# Patient Record
Sex: MALE | Race: BLACK OR AFRICAN AMERICAN | NOT HISPANIC OR LATINO | Employment: OTHER | RURAL
[De-identification: names, ages, dates, MRNs, and addresses within clinical notes are randomized per-mention and may not be internally consistent; named-entity substitution may affect disease eponyms.]

---

## 2023-08-30 DIAGNOSIS — I73.9 PERIPHERAL VASCULAR DISEASE, UNSPECIFIED: Primary | ICD-10-CM

## 2023-09-27 ENCOUNTER — INITIAL CONSULT (OUTPATIENT)
Dept: VASCULAR SURGERY | Facility: CLINIC | Age: 68
End: 2023-09-27
Payer: OTHER GOVERNMENT

## 2023-09-27 VITALS — BODY MASS INDEX: 36.94 KG/M2 | HEIGHT: 70 IN | WEIGHT: 258 LBS

## 2023-09-27 DIAGNOSIS — Z72.0 TOBACCO ABUSE: ICD-10-CM

## 2023-09-27 DIAGNOSIS — I73.9 PERIPHERAL VASCULAR DISEASE, UNSPECIFIED: ICD-10-CM

## 2023-09-27 DIAGNOSIS — E11.59 TYPE 2 DIABETES MELLITUS WITH OTHER CIRCULATORY COMPLICATION, UNSPECIFIED WHETHER LONG TERM INSULIN USE: ICD-10-CM

## 2023-09-27 DIAGNOSIS — I73.9 PVD (PERIPHERAL VASCULAR DISEASE): Primary | ICD-10-CM

## 2023-09-27 PROCEDURE — 99204 OFFICE O/P NEW MOD 45 MIN: CPT | Mod: S$PBB,,, | Performed by: SURGERY

## 2023-09-27 PROCEDURE — 99203 OFFICE O/P NEW LOW 30 MIN: CPT | Mod: PBBFAC | Performed by: SURGERY

## 2023-09-27 PROCEDURE — 99204 PR OFFICE/OUTPT VISIT, NEW, LEVL IV, 45-59 MIN: ICD-10-PCS | Mod: S$PBB,,, | Performed by: SURGERY

## 2023-09-27 RX ORDER — SODIUM CHLORIDE 9 MG/ML
INJECTION, SOLUTION INTRAVENOUS CONTINUOUS
Status: CANCELLED | OUTPATIENT
Start: 2023-09-27

## 2023-09-27 RX ORDER — INSULIN ASPART 100 [IU]/ML
INJECTION, SOLUTION INTRAVENOUS; SUBCUTANEOUS
COMMUNITY

## 2023-09-27 RX ORDER — LISINOPRIL 10 MG/1
10 TABLET ORAL
COMMUNITY

## 2023-09-27 RX ORDER — INSULIN GLARGINE 100 [IU]/ML
60 INJECTION, SOLUTION SUBCUTANEOUS
COMMUNITY

## 2023-09-27 RX ORDER — CARVEDILOL 3.12 MG/1
TABLET ORAL
COMMUNITY

## 2023-09-27 RX ORDER — ONDANSETRON 4 MG/1
8 TABLET, ORALLY DISINTEGRATING ORAL EVERY 8 HOURS PRN
Status: CANCELLED | OUTPATIENT
Start: 2023-09-27

## 2023-09-27 RX ORDER — FUROSEMIDE 40 MG/1
TABLET ORAL
COMMUNITY

## 2023-09-27 RX ORDER — SPIRONOLACTONE 25 MG/1
TABLET ORAL
COMMUNITY

## 2023-09-27 RX ORDER — ONDANSETRON 2 MG/ML
4 INJECTION INTRAMUSCULAR; INTRAVENOUS EVERY 12 HOURS PRN
Status: CANCELLED | OUTPATIENT
Start: 2023-09-27

## 2023-09-27 RX ORDER — PANTOPRAZOLE SODIUM 40 MG/1
40 TABLET, DELAYED RELEASE ORAL
COMMUNITY

## 2023-09-27 RX ORDER — GABAPENTIN 300 MG/1
300 CAPSULE ORAL
COMMUNITY

## 2023-09-27 NOTE — PROGRESS NOTES
Subjective:       Patient ID: Heraclio Myers is a 68 y.o. male.    Chief Complaint: No chief complaint on file.  Fourth digit right foot mild perforans ulcer.  Great toe amputation on the right.  All toes amputated on left.  No previous vascular surgeries.  Said  angio Samson's states he is got vascular disease on the right, patient has had previous left SFA stent base of 2019 angio CT, recently was CT of the chest done [facility]on[facility]  family history is not on file.  History reviewed. No pertinent past medical history.   History reviewed. No pertinent surgical history.    reports that he has been smoking cigarettes. He has never used smokeless tobacco. He reports that he does not drink alcohol and does not use drugs.   HPI  Review of Systems      Objective:      There were no vitals taken for this visit.   Physical Exam  Constitutional:       Appearance: Normal appearance.   Cardiovascular:      Rate and Rhythm: Normal rate.   Abdominal:      General: Abdomen is flat.      Palpations: Abdomen is soft.      Comments: He is got a rectus sheath diastasis   Musculoskeletal:      Comments: All toes amputated left, right great toe amputated.  Mild perforans ulcer plantar aspect clean right 4th digit   Skin:     Capillary Refill: Capillary refill takes less than 2 seconds.   Neurological:      General: No focal deficit present.      Mental Status: He is alert.   Psychiatric:         Mood and Affect: Mood normal.         Behavior: Behavior normal.         Thought Content: Thought content normal.         Judgment: Judgment normal.           Assessment:       1. PVD (peripheral vascular disease)    2. Type 2 diabetes mellitus with other circulatory complication, unspecified whether long term insulin use    3. Tobacco abuse        Plan:       Angio right lower extremity intervention is indicated, stop smoking

## 2023-09-27 NOTE — PATIENT INSTRUCTIONS
[facility]      Your surgery is scheduled for 10/19 at [facility] on the ground floor of the Ambulatory building. You should arrive at 8:00 at the Ambulatory Care Center located at [address].                                                                                                                                                                                                                                                                                                                                                           Preoperative Instructions        Your pre-op lab work will be on 10/16 on the 1st floor of the [facility] building.                                                                                                                                            Day of Surgery Instructions      Bring a list of all your medications with you the day of your surgery. You can also give the list to your doctor or nurse during your final clinic appointment before surgery.      Do not eat any solid foods or drink any liquids after 12:00 AM (midnight). This includes gum, hard candy, mints, and chewing tobacco.  Medications: Take any medications specified with a small sip of water the morning of your surgery.  Brush your teeth: You may brush your teeth and rinse your mouth. Do not swallow any water or toothpaste.  Clothing: A button front shirt and loose-fitting clothes are the most comfortable before and after surgery. We also recommend low-heeled shoes.  Hair: Avoid buns, ponytails, or hairpieces at the back of the head. Remove or avoid any clips, pins or bands that bind hair. Do not use hairspray. Before going to surgery, you will need to remove any wigs or hairpieces.  We will cover your hair during surgery. Your privacy regarding personal appearance will be respected.  Fingernails: Please be sure to remove all nail polish before you arrive for surgery.  We understand that tips, wraps, gels, etc., are expensive; however, we ask these products to be removed from at least one finger on each hand. Your fingertips are used to accurately monitor your oxygen level during surgery by a device called an oximeter.  Glasses and Contact Lenses: Wear glasses when possible. If contact lenses must be worn, bring a lens case and solution. If glasses are worn, bring a case for them.  Hearing Aids: If you rely on a hearing aid, wear it to the hospital on the day of surgery. This will ensure you can hear and understand everything we need to communicate with you.  Valuables: Jewelry, including body piercings, Dentures, money, and credit cards should be left at home. [facility] is not responsible for valuables that are not secured in our surgery center.  Makeup, Perfume, Creams, Lotions and Deodorants: Do not use any of these products on the day of surgery. Remove false eyelashes prior to surgery.  Implanted Medical Devices: If you have an implanted device, such as a pacemaker or AICD, bring the device information card (if you have it) with you.  Medical Equipment: If you have been fitted for a brace to wear after surgery or you have been given crutches, bring those with you to the surgery center.  Shower: Take a shower with Hibiclens® (chlorhexidine) (available over the counter). This reduces the chance of infection. PLEASE USE CHLORHEXIDINE WASH THE NIGHT BEFORE SURGERY AND THE MORNING OF SURGERY.      Medication instructions:  You may take blood pressure medication with a small drink of water the morning of surgery.      IF YOU ARE ON ANY OF THESE BLOOD THINNERS, MAKE SURE YOUR PHYSICIAN IS AWARE.  Eliquis/Apixaban            Wafarin/Coumadin,Jantoven  Xarelto/Rivaroxaban      Pletal/Cilostazol  Plavix/Clopidogrel          Pradaxa/Dibigatran      If you are diabetic      Follow the diabetic medicine instructions you received during your pre-operative visit.  DO NOT take your insulin or  diabetic medications the morning of surgery.  When you arrive at the surgical center, be sure to tell the nurse you are diabetic.    The following blood sugar medications have to be stopped prior to surgery:    Hold 24 hours prior to surgery:    Libraglutide - Saxenda   Adlyxen - Lixixerutose  Byetta - Exenatide  Saxenda - Liralutide   Victoza    Hold 1 week prior to surgery:    Semaglutide - Ozempic, Wegouy, Rybelsus  Dulaglutide - Trulicity  Tiazepatide - Mounjaro  Bydurcon    Hold 48 hours prior to surgery:    Metformin, Glucovance, Metaglip, Fortamet, Glucophage, Riomet, Avandamet, Glimepiride            Other Items to bring with you and know      Insurance card  Identification card such as 's license, passport, or other picture ID  Copy of your advance directives  List of medications and allergies, if not already provided  Name and phone number of person to contact if your condition changes significantly. YOU CANNOT DRIVE YOURSELF HOME FROM THE HOSPITAL THE DAY OF SURGERY.  PLEASE UNDERSTAND THAT OUR OFFICE DOES NOT GIVE PATHOLOGY RESULTS OR TEST RESULTS OVER THE PHONE. THIS WILL BE DISCUSSED WITH YOU ON YOUR FOLLOW UP APPOINTMENT.          Alcohol and Surgery  We want to help you prepare for and recover from surgery as quickly and safely as possible. Be open and honest with your provider about how many drinks you have per day. Excessive alcohol use is defined as drinking more than three drinks per day. It can affect the outcome of your surgery. Binge drinking (consuming large amounts of alcohol infrequently, such as on weekends) can also affect the outcome of your surgery.  Alcohol withdrawal  If you drink more than three drinks a day, you could have a complication, called alcohol withdrawal, after surgery.  Alcohol withdrawal is a set of symptoms that people have when they suddenly stop drinking after using alcohol  for a long time. During withdrawal, a person's central nervous system overreacts. This  can cause mild symptoms such as shakiness, sweating or hallucinating. It can also cause other more serious side effects. If not treated properly, alcohol withdrawal can cause potentially life-threatening complications after surgery. This can include tremors, seizures, hallucinations, delirium tremors, and even death. Untreated alcohol withdrawal often leads to a longer stay in the hospital, potentially in the Intensive Care Unit.  Chronic heavy drinking also can interfere with several organ systems and biochemical processes in the body.  This interference can cause serious, even life-threatening complications.  Your care team can offer alcohol withdrawal treatment to help:  Decrease the risk of seizures and delirium tremors after surgery  Decrease the risk we will need to restrain you for your own safety or the safety of others  Decrease your risk of falling after surgery  Reduce the use of potent sedative medications  Reduce the time you stay in the hospital after surgery  Reduce the time you might spend on a mechanical ventilator to help you breathe  Lower incidence of organ failure and biochemical complications  Talk to a member of your care team or your primary care physician about your alcohol use if you feel you may be at risk of any of these complications.        Smoking and Surgery  Quitting smoking is extremely important for a successful surgery and recovery. Cigarette smoking compromises your immune system. This increases your risk of an infection after surgery. Quitting the habit before surgery will decrease the surgical risks associated with smoking.

## 2023-10-24 ENCOUNTER — ANESTHESIA (OUTPATIENT)
Dept: SURGERY | Facility: HOSPITAL | Age: 68
End: 2023-10-24
Payer: OTHER GOVERNMENT

## 2023-10-24 ENCOUNTER — HOSPITAL ENCOUNTER (OUTPATIENT)
Facility: HOSPITAL | Age: 68
Discharge: HOME | End: 2023-10-24
Attending: SURGERY | Admitting: SURGERY
Payer: OTHER GOVERNMENT

## 2023-10-24 ENCOUNTER — ANESTHESIA EVENT (OUTPATIENT)
Dept: SURGERY | Facility: HOSPITAL | Age: 68
End: 2023-10-24
Payer: OTHER GOVERNMENT

## 2023-10-24 VITALS
TEMPERATURE: 98 F | RESPIRATION RATE: 16 BRPM | SYSTOLIC BLOOD PRESSURE: 127 MMHG | HEART RATE: 92 BPM | WEIGHT: 256 LBS | DIASTOLIC BLOOD PRESSURE: 64 MMHG | OXYGEN SATURATION: 94 % | BODY MASS INDEX: 36.65 KG/M2 | HEIGHT: 70 IN

## 2023-10-24 DIAGNOSIS — I73.9 PVD (PERIPHERAL VASCULAR DISEASE): Primary | ICD-10-CM

## 2023-10-24 LAB
GLUCOSE SERPL-MCNC: 203 MG/DL (ref 70–105)
GLUCOSE SERPL-MCNC: 232 MG/DL (ref 70–105)
INDIRECT COOMBS: NORMAL
RH BLD: NORMAL
SPECIMEN OUTDATE: NORMAL

## 2023-10-24 PROCEDURE — 86850 RBC ANTIBODY SCREEN: CPT | Performed by: SURGERY

## 2023-10-24 PROCEDURE — 25000003 PHARM REV CODE 250: Performed by: ANESTHESIOLOGY

## 2023-10-24 PROCEDURE — 82962 GLUCOSE BLOOD TEST: CPT

## 2023-10-24 PROCEDURE — 37000009 HC ANESTHESIA EA ADD 15 MINS: Performed by: SURGERY

## 2023-10-24 PROCEDURE — 75716 PR  ANGIO EXTERMITY BILAT: ICD-10-PCS | Mod: 26,,, | Performed by: SURGERY

## 2023-10-24 PROCEDURE — D9220A PRA ANESTHESIA: ICD-10-PCS | Mod: CRNA,,, | Performed by: ANESTHESIOLOGY

## 2023-10-24 PROCEDURE — 71000033 HC RECOVERY, INTIAL HOUR: Performed by: SURGERY

## 2023-10-24 PROCEDURE — 75716 ARTERY X-RAYS ARMS/LEGS: CPT | Mod: 26,,, | Performed by: SURGERY

## 2023-10-24 PROCEDURE — D9220A PRA ANESTHESIA: ICD-10-PCS | Mod: ANES,,, | Performed by: ANESTHESIOLOGY

## 2023-10-24 PROCEDURE — D9220A PRA ANESTHESIA: Mod: CRNA,,, | Performed by: ANESTHESIOLOGY

## 2023-10-24 PROCEDURE — 37000008 HC ANESTHESIA 1ST 15 MINUTES: Performed by: SURGERY

## 2023-10-24 PROCEDURE — 63600175 PHARM REV CODE 636 W HCPCS: Performed by: ANESTHESIOLOGY

## 2023-10-24 PROCEDURE — C1894 INTRO/SHEATH, NON-LASER: HCPCS | Performed by: SURGERY

## 2023-10-24 PROCEDURE — 25500020 PHARM REV CODE 255: Performed by: SURGERY

## 2023-10-24 PROCEDURE — 36000706: Performed by: SURGERY

## 2023-10-24 PROCEDURE — 63600175 PHARM REV CODE 636 W HCPCS: Performed by: SURGERY

## 2023-10-24 PROCEDURE — 25000003 PHARM REV CODE 250: Performed by: NURSE PRACTITIONER

## 2023-10-24 PROCEDURE — C1769 GUIDE WIRE: HCPCS | Performed by: SURGERY

## 2023-10-24 PROCEDURE — 36000707: Performed by: SURGERY

## 2023-10-24 PROCEDURE — D9220A PRA ANESTHESIA: Mod: ANES,,, | Performed by: ANESTHESIOLOGY

## 2023-10-24 PROCEDURE — 71000015 HC POSTOP RECOV 1ST HR: Performed by: SURGERY

## 2023-10-24 PROCEDURE — 71000016 HC POSTOP RECOV ADDL HR: Performed by: SURGERY

## 2023-10-24 RX ORDER — IPRATROPIUM BROMIDE AND ALBUTEROL SULFATE 2.5; .5 MG/3ML; MG/3ML
3 SOLUTION RESPIRATORY (INHALATION) ONCE
Status: DISCONTINUED | OUTPATIENT
Start: 2023-10-24 | End: 2023-10-24 | Stop reason: HOSPADM

## 2023-10-24 RX ORDER — MEPERIDINE HYDROCHLORIDE 25 MG/ML
25 INJECTION INTRAMUSCULAR; INTRAVENOUS; SUBCUTANEOUS EVERY 10 MIN PRN
Status: DISCONTINUED | OUTPATIENT
Start: 2023-10-24 | End: 2023-10-24 | Stop reason: HOSPADM

## 2023-10-24 RX ORDER — CLINDAMYCIN PHOSPHATE 900 MG/50ML
900 INJECTION, SOLUTION INTRAVENOUS
Status: DISCONTINUED | OUTPATIENT
Start: 2023-10-24 | End: 2023-10-24 | Stop reason: HOSPADM

## 2023-10-24 RX ORDER — MIDAZOLAM HYDROCHLORIDE 1 MG/ML
INJECTION INTRAMUSCULAR; INTRAVENOUS
Status: DISCONTINUED | OUTPATIENT
Start: 2023-10-24 | End: 2023-10-24

## 2023-10-24 RX ORDER — CEFAZOLIN SODIUM 1 G/3ML
INJECTION, POWDER, FOR SOLUTION INTRAMUSCULAR; INTRAVENOUS
Status: DISCONTINUED | OUTPATIENT
Start: 2023-10-24 | End: 2023-10-24

## 2023-10-24 RX ORDER — SODIUM CHLORIDE 9 MG/ML
INJECTION, SOLUTION INTRAVENOUS CONTINUOUS
Status: DISCONTINUED | OUTPATIENT
Start: 2023-10-24 | End: 2023-10-24 | Stop reason: HOSPADM

## 2023-10-24 RX ORDER — PROPOFOL 10 MG/ML
VIAL (ML) INTRAVENOUS
Status: DISCONTINUED | OUTPATIENT
Start: 2023-10-24 | End: 2023-10-24

## 2023-10-24 RX ORDER — ONDANSETRON 2 MG/ML
4 INJECTION INTRAMUSCULAR; INTRAVENOUS EVERY 12 HOURS PRN
Status: DISCONTINUED | OUTPATIENT
Start: 2023-10-24 | End: 2023-10-24 | Stop reason: HOSPADM

## 2023-10-24 RX ORDER — DIPHENHYDRAMINE HYDROCHLORIDE 50 MG/ML
25 INJECTION INTRAMUSCULAR; INTRAVENOUS EVERY 6 HOURS PRN
Status: DISCONTINUED | OUTPATIENT
Start: 2023-10-24 | End: 2023-10-24 | Stop reason: HOSPADM

## 2023-10-24 RX ORDER — PHENYLEPHRINE HYDROCHLORIDE 10 MG/ML
INJECTION INTRAVENOUS
Status: DISCONTINUED | OUTPATIENT
Start: 2023-10-24 | End: 2023-10-24

## 2023-10-24 RX ORDER — ONDANSETRON 4 MG/1
8 TABLET, ORALLY DISINTEGRATING ORAL EVERY 8 HOURS PRN
Status: DISCONTINUED | OUTPATIENT
Start: 2023-10-24 | End: 2023-10-24 | Stop reason: HOSPADM

## 2023-10-24 RX ORDER — HYDROMORPHONE HYDROCHLORIDE 2 MG/ML
0.5 INJECTION, SOLUTION INTRAMUSCULAR; INTRAVENOUS; SUBCUTANEOUS EVERY 5 MIN PRN
Status: DISCONTINUED | OUTPATIENT
Start: 2023-10-24 | End: 2023-10-24 | Stop reason: HOSPADM

## 2023-10-24 RX ORDER — HEPARIN SODIUM 1000 [USP'U]/ML
INJECTION, SOLUTION INTRAVENOUS; SUBCUTANEOUS
Status: DISCONTINUED | OUTPATIENT
Start: 2023-10-24 | End: 2023-10-24 | Stop reason: HOSPADM

## 2023-10-24 RX ORDER — ONDANSETRON 2 MG/ML
INJECTION INTRAMUSCULAR; INTRAVENOUS
Status: DISCONTINUED | OUTPATIENT
Start: 2023-10-24 | End: 2023-10-24

## 2023-10-24 RX ORDER — LIDOCAINE HYDROCHLORIDE 20 MG/ML
INJECTION, SOLUTION EPIDURAL; INFILTRATION; INTRACAUDAL; PERINEURAL
Status: DISCONTINUED | OUTPATIENT
Start: 2023-10-24 | End: 2023-10-24

## 2023-10-24 RX ORDER — MORPHINE SULFATE 10 MG/ML
4 INJECTION INTRAMUSCULAR; INTRAVENOUS; SUBCUTANEOUS EVERY 5 MIN PRN
Status: DISCONTINUED | OUTPATIENT
Start: 2023-10-24 | End: 2023-10-24 | Stop reason: HOSPADM

## 2023-10-24 RX ORDER — FENTANYL CITRATE 50 UG/ML
INJECTION, SOLUTION INTRAMUSCULAR; INTRAVENOUS
Status: DISCONTINUED | OUTPATIENT
Start: 2023-10-24 | End: 2023-10-24

## 2023-10-24 RX ORDER — ONDANSETRON 2 MG/ML
4 INJECTION INTRAMUSCULAR; INTRAVENOUS DAILY PRN
Status: DISCONTINUED | OUTPATIENT
Start: 2023-10-24 | End: 2023-10-24 | Stop reason: HOSPADM

## 2023-10-24 RX ADMIN — CEFAZOLIN 100 MG: 1 INJECTION, POWDER, FOR SOLUTION INTRAMUSCULAR; INTRAVENOUS; PARENTERAL at 08:10

## 2023-10-24 RX ADMIN — PHENYLEPHRINE HYDROCHLORIDE 100 MCG: 10 INJECTION INTRAVENOUS at 07:10

## 2023-10-24 RX ADMIN — FENTANYL CITRATE 50 MCG: 50 INJECTION INTRAMUSCULAR; INTRAVENOUS at 07:10

## 2023-10-24 RX ADMIN — FENTANYL CITRATE 50 MCG: 50 INJECTION INTRAMUSCULAR; INTRAVENOUS at 08:10

## 2023-10-24 RX ADMIN — MIDAZOLAM 2 MG: 1 INJECTION INTRAMUSCULAR; INTRAVENOUS at 07:10

## 2023-10-24 RX ADMIN — PHENYLEPHRINE HYDROCHLORIDE 100 MCG: 10 INJECTION INTRAVENOUS at 08:10

## 2023-10-24 RX ADMIN — Medication 50 MG: at 07:10

## 2023-10-24 RX ADMIN — ONDANSETRON 4 MG: 2 INJECTION INTRAMUSCULAR; INTRAVENOUS at 08:10

## 2023-10-24 RX ADMIN — SODIUM CHLORIDE: 9 INJECTION, SOLUTION INTRAVENOUS at 07:10

## 2023-10-24 RX ADMIN — LIDOCAINE HYDROCHLORIDE 100 MG: 20 INJECTION, SOLUTION INTRAVENOUS at 07:10

## 2023-10-24 RX ADMIN — Medication 100 MG: at 07:10

## 2023-10-24 NOTE — OP NOTE
[facility]  Surgery Department  Operative Note    SUMMARY     Date of Procedure: 10/24/2023     Procedure: Procedure(s) (LRB):  Angiogram Extremity (Bilateral)     Surgeon(s) and Role:     * Fauzia Davis MD - Primary    Assisting Surgeon: None    Pre-Operative Diagnosis: PVD (peripheral vascular disease) [I73.9]    Post-Operative Diagnosis: Post-Op Diagnosis Codes:     * PVD (peripheral vascular disease) [I73.9]    Anesthesia: General/MAC    Operative Findings (including complications, if any):  Aorto bi-iliac endograft present, extreme scarring left groin tibial disease on the left single-vessel runoff into the foot    Description of Technical Procedures:  Taken operative suite bilateral groins prepped draped.  Utilize ultrasound guidance thickening of tissues and scarring left groin we are able to accessed the common femoral artery J-wire placed this went up into the aortoiliac system.  The endovascular aorto bi-iliac graft present.  Scarring in the groin precluded advancement of the 5 Polish sheath we elected use micro puncture site sheath we did.  The risks we did a retrograde iliac angiogram along with the runoff of the left leg.  We are going to buttock unable to go up and over with this endograft present but where we get a good look at the contralateral leg.  Did not heparinized the patient we removed the micro puncture sheath held pressure left groin patient tolerated procedure well we used minimal dye    Significant Surgical Tasks Conducted by the Assistant(s), if Applicable:  Held pressure    Estimated Blood Loss (EBL): * No values recorded between 10/24/2023  8:10 AM and 10/24/2023  8:28 AM *2cc           Implants: * No implants in log *    Specimens:   Specimen (24h ago, onward)      None                    Condition: Good    Disposition: PACU - hemodynamically stable.    Attestation: I was present and scrubbed for the entire procedure.

## 2023-10-24 NOTE — PROGRESS NOTES
Vascular    Wound right foot  Crt mildly increased  Plan for angio and intervention if amenable  Patient agreeable

## 2023-10-24 NOTE — DISCHARGE SUMMARY
[facility]  Discharge Note  Short Stay    Procedure(s) (LRB):  Angiogram Extremity (Bilateral)      OUTCOME: Patient tolerated treatment/procedure well without complication and is now ready for discharge.    DISPOSITION: Home or Self Care    FINAL DIAGNOSIS:  PVD (peripheral vascular disease)    FOLLOWUP: In clinic    DISCHARGE INSTRUCTIONS:    Discharge Procedure Orders   US ARTERIAL DOPPLER EXAM   Standing Status: Future Standing Exp. Date: 10/24/24     Order Specific Question Answer Comments   May the Radiologist modify the order per protocol to meet the clinical needs of the patient? Yes    Release to patient Immediate      Diet general     Other restrictions (specify):   Order Comments: No ddriving for 72 hours, continue wound care     Remove dressing in 48 hours        TIME SPENT ON DISCHARGE: 15 minutes

## 2023-10-24 NOTE — TRANSFER OF CARE
"Anesthesia Transfer of Care Note    Patient: Heraclio Myers    Procedure(s) Performed: Procedure(s) (LRB):  Angiogram Extremity (Bilateral)    Patient location: PACU    Anesthesia Type: general    Transport from OR: Transported from OR on 2-3 L/min O2 by NC with adequate spontaneous ventilation    Post pain: adequate analgesia    Post assessment: no apparent anesthetic complications and tolerated procedure well    Post vital signs: stable    Level of consciousness: awake, alert and oriented    Nausea/Vomiting: no nausea/vomiting    Complications: none    Transfer of care protocol was followedComments: Report Given to PACU rn VSS      Last vitals:   Visit Vitals  /78 (BP Location: Left arm, Patient Position: Lying)   Pulse (!) 58   Temp 36.8 °C (98.3 °F) (Oral)   Resp 18   Ht 5' 10" (1.778 m)   Wt 116.1 kg (256 lb)   SpO2 96%   BMI 36.73 kg/m²     "

## 2023-10-24 NOTE — ANESTHESIA PREPROCEDURE EVALUATION
10/24/2023  Heraclio Myers is a 68 y.o., male.      Pre-op Assessment    I have reviewed the Patient Summary Reports.     I have reviewed the Nursing Notes. I have reviewed the NPO Status.   I have reviewed the Medications.     Review of Systems  Anesthesia Hx:  Denies Family Hx of Anesthesia complications.   Denies Personal Hx of Anesthesia complications.   Social:  Non-Smoker, No Alcohol Use    Hematology/Oncology:  Hematology Normal   Oncology Normal     EENT/Dental:EENT/Dental Normal   Cardiovascular:   Exercise tolerance: poor Hypertension Past MI CABG/stent  PVD ECG has been reviewed.    Pulmonary:  Pulmonary Normal    Renal/:  Renal/ Normal     Hepatic/GI:  Hepatic/GI Normal    Musculoskeletal:  Musculoskeletal Normal    Neurological:  Neurology Normal    Endocrine:   Diabetes    Dermatological:  Skin Normal    Psych:  Psychiatric Normal           Physical Exam  General: Well nourished, Cooperative, Alert and Oriented    Airway:  Mallampati: II / II  Mouth Opening: Normal  TM Distance: Normal  Neck ROM: Normal ROM    Dental:  Intact    Chest/Lungs:  Clear to auscultation    Heart:  Rate: Normal  Rhythm: Regular Rhythm  Sounds: Normal        Chemistry        Component Value Date/Time     10/18/2023 0843    K 4.4 10/18/2023 0843     10/18/2023 0843    CO2 34 (H) 10/18/2023 0843    BUN 19 (H) 10/18/2023 0843    CREATININE 1.52 (H) 10/18/2023 0843    GLU 99 10/18/2023 0843        Component Value Date/Time    CALCIUM 9.4 10/18/2023 0843        Lab Results   Component Value Date    WBC 5.55 10/18/2023    HGB 13.6 10/18/2023    HCT 41.9 10/18/2023     10/18/2023     No results found for this or any previous visit.      Anesthesia Plan  Type of Anesthesia, risks & benefits discussed:    Anesthesia Type: Gen Supraglottic Airway, Gen ETT  Intra-op Monitoring Plan: Standard ASA Monitors  Post  Op Pain Control Plan: multimodal analgesia  Induction:  IV  Airway Plan: Direct  Informed Consent: Informed consent signed with the Patient and all parties understand the risks and agree with anesthesia plan.  All questions answered.   ASA Score: 3  Day of Surgery Review of History & Physical: H&P Update referred to the surgeon/provider.I have interviewed and examined the patient. I have reviewed the patient's H&P dated: There are no significant changes.     Ready For Surgery From Anesthesia Perspective.     .       (0) independent

## 2023-10-24 NOTE — PROGRESS NOTES
842 RECEIVED TO RR AWAKE, ALERT. ORIENTATION GIVEN. NO RESP. DISTRESS NOTED. IV INFUSING RIGHT FOREARM 20G. CATH. DRESSING LEFT GROIN D/I, NO SWELLING NOTED. EUSEBIO. PEDAL AND POSTERIOR TIBIAL PULSES AUDIBLE WITH DOPPLER. BLOOD SUGAR 203. NO DISTRESS NOTED.    925 AWAKE, ALERT. NO BLEEDING OR SWELLING LEFT GROIN. PULSES REMAIN + BILATERALLY WITH DOPPLER. V/S STABLE, SEE FLOWS HEET. TRANSFERRED TO ROOM.

## 2023-10-26 NOTE — ANESTHESIA POSTPROCEDURE EVALUATION
Anesthesia Post Evaluation    Patient: Heraclio Myers    Procedure(s) Performed: Procedure(s) (LRB):  Angiogram Extremity (Bilateral)    Final Anesthesia Type: general      Patient location during evaluation: PACU  Patient participation: Yes- Able to Participate  Level of consciousness: awake and alert  Post-procedure vital signs: reviewed and stable  Pain management: adequate  Airway patency: patent  RANDELL mitigation strategies: Multimodal analgesia  PONV status at discharge: No PONV  Anesthetic complications: no      Cardiovascular status: blood pressure returned to baseline  Respiratory status: unassisted  Hydration status: euvolemic  Follow-up not needed.          Vitals Value Taken Time   /64 10/24/23 1231   Temp 36.8 °C (98.3 °F) 10/24/23 0848   Pulse 90 10/24/23 1232   Resp 16 10/24/23 1230   SpO2 94 % 10/24/23 1232   Vitals shown include unvalidated device data.      Event Time   Out of Recovery 09:25:00         Pain/Tasia Score: No data recorded

## 2023-11-08 ENCOUNTER — OFFICE VISIT (OUTPATIENT)
Dept: VASCULAR SURGERY | Facility: CLINIC | Age: 68
End: 2023-11-08
Payer: OTHER GOVERNMENT

## 2023-11-08 ENCOUNTER — HOSPITAL ENCOUNTER (OUTPATIENT)
Dept: RADIOLOGY | Facility: HOSPITAL | Age: 68
Discharge: HOME | End: 2023-11-08
Attending: SURGERY
Payer: OTHER GOVERNMENT

## 2023-11-08 VITALS — OXYGEN SATURATION: 98 % | BODY MASS INDEX: 36.65 KG/M2 | HEIGHT: 70 IN | HEART RATE: 96 BPM | WEIGHT: 256 LBS

## 2023-11-08 DIAGNOSIS — I73.9 CLAUDICATION: ICD-10-CM

## 2023-11-08 DIAGNOSIS — I73.9 PVD (PERIPHERAL VASCULAR DISEASE): ICD-10-CM

## 2023-11-08 DIAGNOSIS — F17.200 SMOKER: ICD-10-CM

## 2023-11-08 DIAGNOSIS — I73.9 PVD (PERIPHERAL VASCULAR DISEASE): Primary | ICD-10-CM

## 2023-11-08 PROCEDURE — 99214 OFFICE O/P EST MOD 30 MIN: CPT | Mod: S$PBB,,, | Performed by: NURSE PRACTITIONER

## 2023-11-08 PROCEDURE — 99214 PR OFFICE/OUTPT VISIT, EST, LEVL IV, 30-39 MIN: ICD-10-PCS | Mod: S$PBB,,, | Performed by: NURSE PRACTITIONER

## 2023-11-08 PROCEDURE — 93925 LOWER EXTREMITY STUDY: CPT | Mod: 26,,, | Performed by: SURGERY

## 2023-11-08 PROCEDURE — 93925 LOWER EXTREMITY STUDY: CPT | Mod: TC

## 2023-11-08 PROCEDURE — 99213 OFFICE O/P EST LOW 20 MIN: CPT | Mod: PBBFAC,25 | Performed by: NURSE PRACTITIONER

## 2023-11-08 PROCEDURE — 93922 UPR/L XTREMITY ART 2 LEVELS: CPT | Mod: 26,,, | Performed by: SURGERY

## 2023-11-08 PROCEDURE — 93922 US ARTERIAL LOWER EXTREMITY BILAT WITH ABI (XPD): ICD-10-PCS | Mod: 26,,, | Performed by: SURGERY

## 2023-11-08 PROCEDURE — 93925 US ARTERIAL LOWER EXTREMITY BILAT WITH ABI (XPD): ICD-10-PCS | Mod: 26,,, | Performed by: SURGERY

## 2023-11-08 NOTE — PROGRESS NOTES
"Subjective:       Patient ID: Heraclio Myers is a 68 y.o. male.    Chief Complaint: Follow-up (US today)  Fourth digit right foot mild perforans ulcer.  Great toe amputation on the right.  All toes amputated on left.  No previous vascular surgeries.  Said  angio Samson's states he is got vascular disease on the right, patient has had previous left SFA stent base of 2019 angio CT, recently was CT of the chest done Samson's    11/8/2023 recent right diagnostic peripheral angiogram extreme scarring with left groin tibial disease with left single-vessel runoff into foot right arterial duplex today with occluded SFA right KARLY 0.51 with wound to the right foot past surgical history EVAR past medical history to include smoking hyperlipidemia CAD with CABG by Dr. Marinelli with left saphenous vein graft patient with claudication rest pain tissue loss on the right he is a diabetic  family history includes Diabetes in his brother and father.  Past Medical History:   Diagnosis Date    Diabetes mellitus type I     Hypertension       Past Surgical History:   Procedure Laterality Date    ANGIOGRAPHY OF LOWER EXTREMITY Bilateral 10/24/2023    Procedure: Angiogram Extremity;  Surgeon: Fauzia Davis MD;  Location: [facility];  Service: General;  Laterality: Bilateral;    CORONARY ARTERY BYPASS GRAFT      PLACEMENT-STENT      TOE AMPUTATION Left     toes       reports that he has been smoking cigarettes. He has never used smokeless tobacco. He reports that he does not drink alcohol and does not use drugs.   HPI  Review of Systems   Cardiovascular:  Positive for claudication.   Integumentary:  Positive for wound.         Objective:      Pulse 96   Ht 5' 10" (1.778 m)   Wt 116.1 kg (256 lb)   SpO2 98%   BMI 36.73 kg/m²    Physical Exam  Vitals and nursing note reviewed.   Constitutional:       Appearance: Normal appearance.   HENT:      Head: Normocephalic.      Mouth/Throat:      Mouth: Mucous membranes are moist.   Eyes:      " Conjunctiva/sclera: Conjunctivae normal.   Cardiovascular:      Rate and Rhythm: Normal rate and regular rhythm.   Pulmonary:      Effort: Pulmonary effort is normal.   Abdominal:      Palpations: Abdomen is soft.   Musculoskeletal:      Comments: Well healed surgical incision left lower extremity   Skin:     General: Skin is warm and dry.      Comments: Dressing right foot clean dry and intact   Neurological:      Mental Status: He is alert and oriented to person, place, and time.   Psychiatric:         Mood and Affect: Mood normal.           Assessment:       1. PVD (peripheral vascular disease)    2. Claudication    3. Smoker        Plan:       Need surgical clearance by cardiologist Dr. Escoto will try to coordinate appointment with [facility] appointment on November 15th at 8:30 a.m.  Continue aspirin, hold Ozempic 7 days prior to surgery to right fem-pop at [facility]      11/30/2023 Dr. [facility] cardiologist deemed patient low risk for cardiac event for right fem-pop  Scheduled December 13th at [facility] preop MediPort ointment December 6

## 2023-11-09 ENCOUNTER — TELEPHONE (OUTPATIENT)
Dept: VASCULAR SURGERY | Facility: CLINIC | Age: 68
End: 2023-11-09
Payer: OTHER GOVERNMENT

## 2023-11-09 NOTE — TELEPHONE ENCOUNTER
Vascular Surgery    Notify patient that Dr. Davis recommends right fem-pop when cleared by Dr. Escoto his cardiologist    Dr. Torres [facility] office to call him with cardiology appointment once patient is cleared from cardiology standpoint instructed patient to notify our office so we can schedule right fem-pop at [facility] with Dr. Davis

## 2023-11-30 ENCOUNTER — TELEPHONE (OUTPATIENT)
Dept: VASCULAR SURGERY | Facility: CLINIC | Age: 68
End: 2023-11-30
Payer: OTHER GOVERNMENT

## 2023-11-30 DIAGNOSIS — I73.9 PVD (PERIPHERAL VASCULAR DISEASE): Primary | ICD-10-CM

## 2023-11-30 NOTE — TELEPHONE ENCOUNTER
Vascular surgery    Dr. Escoto, cardiologist at [facility] deemed patient low risk for surgery    Scheduled patient for right  Fem-pop at [facility] Wednesday December 13th    Preadmission appointment December 6th at 10:30 a.m.    Surgery preop instructions given to patient wife via phone  Instructed patient to hold Ozempic after December 3rd okay to continue aspirin patient wife reports he is no longer taking Plavix

## 2023-12-13 ENCOUNTER — OUTSIDE PLACE OF SERVICE (OUTPATIENT)
Dept: VASCULAR SURGERY | Facility: CLINIC | Age: 68
End: 2023-12-13
Payer: OTHER GOVERNMENT

## 2023-12-13 PROCEDURE — 35656 PR BYPASS GRAFT OTHR,FEM-POP: ICD-10-PCS | Mod: 22,RT,, | Performed by: SURGERY

## 2023-12-13 PROCEDURE — 35656 BPG FEMORAL-POPLITEAL: CPT | Mod: 22,RT,, | Performed by: SURGERY

## 2023-12-15 ENCOUNTER — OUTSIDE PLACE OF SERVICE (OUTPATIENT)
Dept: VASCULAR SURGERY | Facility: CLINIC | Age: 68
End: 2023-12-15
Payer: OTHER GOVERNMENT

## 2023-12-15 PROCEDURE — 99024 POSTOP FOLLOW-UP VISIT: CPT | Mod: ,,, | Performed by: SURGERY

## 2023-12-15 PROCEDURE — 99024 PR POST-OP FOLLOW-UP VISIT: ICD-10-PCS | Mod: ,,, | Performed by: SURGERY

## 2023-12-20 ENCOUNTER — DOCUMENTATION ONLY (OUTPATIENT)
Dept: SURGERY | Facility: HOSPITAL | Age: 68
End: 2023-12-20
Payer: OTHER GOVERNMENT

## 2023-12-20 ENCOUNTER — TELEPHONE (OUTPATIENT)
Dept: SURGERY | Facility: HOSPITAL | Age: 68
End: 2023-12-20
Payer: OTHER GOVERNMENT

## 2023-12-20 NOTE — TELEPHONE ENCOUNTER
Vascular Surgery    Received message  ne[facility]    Also requesting more pain medicine    eliquis 5 mg prescription Norco 7.5 #12 prescription

## 2023-12-28 ENCOUNTER — TELEPHONE (OUTPATIENT)
Dept: VASCULAR SURGERY | Facility: CLINIC | Age: 68
End: 2023-12-28
Payer: OTHER GOVERNMENT

## 2023-12-28 NOTE — TELEPHONE ENCOUNTER
Vascular Surgery    [facility] not [facility] for [facility]    We will fax Xarelto 2.5 mg p.o. b.i.d. number 60 11 refills for PAT monitor renal function fax [phone]. [phone]

## 2024-01-02 ENCOUNTER — OFFICE VISIT (OUTPATIENT)
Dept: VASCULAR SURGERY | Facility: CLINIC | Age: 69
End: 2024-01-02
Payer: OTHER GOVERNMENT

## 2024-01-02 VITALS — HEIGHT: 70 IN | WEIGHT: 256 LBS | BODY MASS INDEX: 36.65 KG/M2

## 2024-01-02 DIAGNOSIS — I73.9 PVD (PERIPHERAL VASCULAR DISEASE): Primary | ICD-10-CM

## 2024-01-02 DIAGNOSIS — Z09 POSTOP CHECK: ICD-10-CM

## 2024-01-02 PROCEDURE — 99024 POSTOP FOLLOW-UP VISIT: CPT | Mod: ,,, | Performed by: NURSE PRACTITIONER

## 2024-01-02 PROCEDURE — 99214 OFFICE O/P EST MOD 30 MIN: CPT | Mod: PBBFAC | Performed by: NURSE PRACTITIONER

## 2024-01-02 RX ORDER — AMOXICILLIN 500 MG
1000 CAPSULE ORAL 2 TIMES DAILY
COMMUNITY

## 2024-01-02 NOTE — PROGRESS NOTES
"Subjective:       Patient ID: Heraclio Myers is a 68 y.o. male.    Chief Complaint: Follow-up (2 week post op ) and Post-op Evaluation (Fem pop)  01/2/2023 right fem-pop at [facility]  Incisions without signs of infection  Right foot wound improving followed by [facility] wound care  family history includes Diabetes in his brother and father.  Past Medical History:   Diagnosis Date    Diabetes mellitus type I     Hypertension       Past Surgical History:   Procedure Laterality Date    ANGIOGRAPHY OF LOWER EXTREMITY Bilateral 10/24/2023    Procedure: Angiogram Extremity;  Surgeon: Fauzia Davis MD;  Location: [facility];  Service: General;  Laterality: Bilateral;    CORONARY ARTERY BYPASS GRAFT      PLACEMENT-STENT      TOE AMPUTATION Left     toes       reports that he has been smoking cigarettes. He has never used smokeless tobacco. He reports that he does not drink alcohol and does not use drugs.   HPI  Review of Systems      Objective:      Ht 5' 10" (1.778 m)   Wt 116.1 kg (256 lb)   BMI 36.73 kg/m²    Physical Exam      Assessment:       1. PVD (peripheral vascular disease)    2. Postop check        Plan:       Continue Eliquis aspirin  DC skin staples  Postop ABIs in 1 month  Continue wound care at [facility]      "

## 2024-01-05 ENCOUNTER — TELEPHONE (OUTPATIENT)
Dept: SURGERY | Facility: HOSPITAL | Age: 69
End: 2024-01-05
Payer: OTHER GOVERNMENT

## 2024-01-05 RX ORDER — HYDROCODONE BITARTRATE AND ACETAMINOPHEN 7.5; 325 MG/1; MG/1
1 TABLET ORAL EVERY 6 HOURS PRN
Qty: 15 TABLET | Refills: 0 | Status: SHIPPED | OUTPATIENT
Start: 2024-01-05

## 2024-01-08 ENCOUNTER — OFFICE VISIT (OUTPATIENT)
Dept: VASCULAR SURGERY | Facility: CLINIC | Age: 69
End: 2024-01-08
Payer: OTHER GOVERNMENT

## 2024-01-08 VITALS — HEIGHT: 70 IN | BODY MASS INDEX: 36.65 KG/M2 | WEIGHT: 256 LBS

## 2024-01-08 DIAGNOSIS — I73.9 PVD (PERIPHERAL VASCULAR DISEASE): ICD-10-CM

## 2024-01-08 DIAGNOSIS — M86.9 OSTEOMYELITIS, UNSPECIFIED SITE, UNSPECIFIED TYPE: Primary | ICD-10-CM

## 2024-01-08 PROCEDURE — 99214 OFFICE O/P EST MOD 30 MIN: CPT | Mod: PBBFAC | Performed by: NURSE PRACTITIONER

## 2024-01-08 PROCEDURE — 99215 OFFICE O/P EST HI 40 MIN: CPT | Mod: S$PBB,,, | Performed by: NURSE PRACTITIONER

## 2024-01-08 NOTE — PROGRESS NOTES
"Subjective:       Patient ID: Heraclio Myers is a 68 y.o. male.    Chief Complaint: Follow-up (1 WEEK F/U )  01/2/2023 right fem-pop at [facility]  Incisions without signs of infection  Right foot wound improving followed by [place] wound care    01/08/2024 patient has been on p.o. Levaquin for 4 weeks followed by wound care right foot x-ray at [facility] 12/28/2023 findings destruction distal 5th metatarsal lateral aspect with tissue ulceration suggest active osteomyelitis, denies fever chills    family history includes Diabetes in his brother and father.  Past Medical History:   Diagnosis Date    Diabetes mellitus type I     Hypertension       Past Surgical History:   Procedure Laterality Date    ANGIOGRAPHY OF LOWER EXTREMITY Bilateral 10/24/2023    Procedure: Angiogram Extremity;  Surgeon: Fauzia Davis MD;  Location: [facility];  Service: General;  Laterality: Bilateral;    CORONARY ARTERY BYPASS GRAFT      PLACEMENT-STENT      TOE AMPUTATION Left     toes       reports that he has been smoking cigarettes. He has never used smokeless tobacco. He reports that he does not drink alcohol and does not use drugs.   Follow-up  Pertinent negatives include no chills.     Review of Systems   Constitutional:  Negative for chills.   Integumentary:  Positive for wound.         Objective:      Ht 5' 10" (1.778 m)   Wt 116.1 kg (256 lb)   BMI 36.73 kg/m²    Physical Exam  Vitals and nursing note reviewed.   Constitutional:       Appearance: Normal appearance.   HENT:      Head: Normocephalic.      Mouth/Throat:      Mouth: Mucous membranes are moist.   Eyes:      Conjunctiva/sclera: Conjunctivae normal.   Cardiovascular:      Rate and Rhythm: Normal rate and regular rhythm.   Pulmonary:      Effort: Pulmonary effort is normal.   Abdominal:      Palpations: Abdomen is soft.   Skin:     General: Skin is warm and dry.      Comments: Right groin incision healing no purulence or cellulitis open pink granulation tissue " superficial    Wound lateral right foot with necrotic fibrinous tissue no purulence no cellulitis   Neurological:      Mental Status: He is alert and oriented to person, place, and time.   Psychiatric:         Mood and Affect: Mood normal.           Assessment:       1. Osteomyelitis, unspecified site, unspecified type    2. PVD (peripheral vascular disease)        Plan:     Stop Ozempic preop last dose was yesterday hold Eliquis  2 days prior to surgery  Right 5th toe amputation at [facility] Wednesday January 17th instructed patient to be there at 6:30 a.m.

## 2024-01-17 ENCOUNTER — OUTSIDE PLACE OF SERVICE (OUTPATIENT)
Dept: VASCULAR SURGERY | Facility: CLINIC | Age: 69
End: 2024-01-17
Payer: OTHER GOVERNMENT

## 2024-01-17 PROCEDURE — 99499 UNLISTED E&M SERVICE: CPT | Mod: ,,, | Performed by: SURGERY

## 2024-01-17 PROCEDURE — 28810 AMPUTATION TOE & METATARSAL: CPT | Mod: T9,,, | Performed by: SURGERY

## 2024-01-30 ENCOUNTER — HOSPITAL ENCOUNTER (OUTPATIENT)
Dept: RADIOLOGY | Facility: HOSPITAL | Age: 69
Discharge: HOME | End: 2024-01-30
Attending: NURSE PRACTITIONER
Payer: OTHER GOVERNMENT

## 2024-01-30 ENCOUNTER — OFFICE VISIT (OUTPATIENT)
Dept: VASCULAR SURGERY | Facility: CLINIC | Age: 69
End: 2024-01-30
Payer: OTHER GOVERNMENT

## 2024-01-30 VITALS — BODY MASS INDEX: 36.65 KG/M2 | HEIGHT: 70 IN | WEIGHT: 256 LBS

## 2024-01-30 DIAGNOSIS — Z09 POSTOP CHECK: Primary | ICD-10-CM

## 2024-01-30 DIAGNOSIS — I73.9 PVD (PERIPHERAL VASCULAR DISEASE): ICD-10-CM

## 2024-01-30 PROCEDURE — 93923 UPR/LXTR ART STDY 3+ LVLS: CPT | Mod: TC

## 2024-01-30 PROCEDURE — 93923 UPR/LXTR ART STDY 3+ LVLS: CPT | Mod: 26,,, | Performed by: SURGERY

## 2024-01-30 PROCEDURE — 99024 POSTOP FOLLOW-UP VISIT: CPT | Mod: ,,, | Performed by: NURSE PRACTITIONER

## 2024-01-30 PROCEDURE — 99213 OFFICE O/P EST LOW 20 MIN: CPT | Mod: PBBFAC | Performed by: NURSE PRACTITIONER

## 2024-01-30 RX ORDER — SULFAMETHOXAZOLE AND TRIMETHOPRIM 800; 160 MG/1; MG/1
1 TABLET ORAL 2 TIMES DAILY
Qty: 20 TABLET | Refills: 0 | Status: SHIPPED | OUTPATIENT
Start: 2024-01-30

## 2024-01-30 NOTE — PROGRESS NOTES
"Subjective:       Patient ID: Heraclio Myers is a 68 y.o. male.    Chief Complaint: Follow-up (F/u test results )  01/2/2023 right fem-pop at [facility]  Incisions without signs of infection  Right foot wound improving followed by [place] wound care    01/08/2024 patient has been on p.o. Levaquin for 4 weeks followed by wound care right foot x-ray at [facility] 12/28/2023 findings destruction distal 5th metatarsal lateral aspect with tissue ulceration suggest active osteomyelitis, denies fever chills      01/30/2024 postop ABIs improved to 0.88 on the right preop right KARLY 0.51 left KARLY 0.95  Right foot wound postop healing well pink granulation tissue no signs of purulence or cellulitis right femoral incision express small amount of purulence distal incision appears to be superficial E scribed Bactrim and continue cleaning daily antibacterial soap redressed  family history includes Diabetes in his brother and father.  Past Medical History:   Diagnosis Date    Diabetes mellitus type I     Hypertension       Past Surgical History:   Procedure Laterality Date    ANGIOGRAPHY OF LOWER EXTREMITY Bilateral 10/24/2023    Procedure: Angiogram Extremity;  Surgeon: Fauzia Davis MD;  Location: [facility];  Service: General;  Laterality: Bilateral;    CORONARY ARTERY BYPASS GRAFT      PLACEMENT-STENT      TOE AMPUTATION Left     toes       reports that he has been smoking cigarettes. He has never used smokeless tobacco. He reports that he does not drink alcohol and does not use drugs.   HPI  Review of Systems      Objective:      Ht 5' 10" (1.778 m)   Wt 116.1 kg (256 lb)   BMI 36.73 kg/m²    Physical Exam      Assessment:       1. Postop check        Plan:     E scribed Bactrim  Follow-up in one-week check femoral incision foot wound after he sees [facility] wound care next week  Repeat ABIs in 6 months      "

## 2024-02-27 ENCOUNTER — OFFICE VISIT (OUTPATIENT)
Dept: VASCULAR SURGERY | Facility: CLINIC | Age: 69
End: 2024-02-27
Payer: OTHER GOVERNMENT

## 2024-02-27 VITALS — HEIGHT: 70 IN | WEIGHT: 256 LBS | BODY MASS INDEX: 36.65 KG/M2

## 2024-02-27 DIAGNOSIS — I73.9 PVD (PERIPHERAL VASCULAR DISEASE): ICD-10-CM

## 2024-02-27 DIAGNOSIS — M86.9 OSTEOMYELITIS, UNSPECIFIED SITE, UNSPECIFIED TYPE: Primary | ICD-10-CM

## 2024-02-27 PROCEDURE — 99215 OFFICE O/P EST HI 40 MIN: CPT | Mod: S$PBB,,, | Performed by: NURSE PRACTITIONER

## 2024-02-27 PROCEDURE — 99214 OFFICE O/P EST MOD 30 MIN: CPT | Mod: PBBFAC | Performed by: NURSE PRACTITIONER

## 2024-02-27 RX ORDER — CLINDAMYCIN HYDROCHLORIDE 300 MG/1
300 CAPSULE ORAL 3 TIMES DAILY
Qty: 30 CAPSULE | Refills: 0 | Status: SHIPPED | OUTPATIENT
Start: 2024-02-27

## 2024-02-27 NOTE — PROGRESS NOTES
Subjective:       Patient ID: Heraclio Myers is a 69 y.o. male.    Chief Complaint: Follow-up (Hospital follow up)  01/2/2024 right fem-pop at [facility]  Incisions without signs of infection  Right foot wound improving followed by [place] wound care     01/08/2024 patient has been on p.o. Levaquin for 4 weeks followed by wound care right foot x-ray at [facility] 12/28/2023 findings destruction distal 5th metatarsal lateral aspect with tissue ulceration suggest active osteomyelitis, denies fever chills        01/30/2024 postop ABIs improved to 0.88 on the right preop right KARLY 0.51 left KARLY 0.95  Right foot wound postop healing well pink granulation tissue no signs of purulence or cellulitis right femoral incision express small amount of purulence distal incision appears to be superficial E scribed Bactrim and continue cleaning daily antibacterial soap redressed    02/27/2024  wound check previous right fem-pop January 2nd at [facility] right foot wound followed by [place] wound care clinic recent right 5th toe amputation for chronic osteo myelitis pink healthy granulation tissue to wound however palpable bone exposure at the base no foul odor or purulence    He has completed p.o. antibiotics for superficial right groin surgical site infection incision has improved minimum amount of serous drainage noted  family history includes Diabetes in his brother and father.  Past Medical History:   Diagnosis Date    Diabetes mellitus type I     Hypertension       Past Surgical History:   Procedure Laterality Date    ANGIOGRAPHY OF LOWER EXTREMITY Bilateral 10/24/2023    Procedure: Angiogram Extremity;  Surgeon: Fauzia Davis MD;  Location: [facility];  Service: General;  Laterality: Bilateral;    CORONARY ARTERY BYPASS GRAFT      PLACEMENT-STENT      TOE AMPUTATION Left     toes    Wound check   reports that he has been smoking cigarettes. He has never used smokeless tobacco. He reports that he does not drink alcohol and does  "not use drugs.   Follow-up      Review of Systems   Cardiovascular:  Negative for claudication.   Integumentary:  Positive for wound.         Objective:      Ht 5' 10" (1.778 m)   Wt 116.1 kg (256 lb)   BMI 36.73 kg/m²    Physical Exam  Vitals and nursing note reviewed.   Constitutional:       Appearance: Normal appearance.   HENT:      Head: Normocephalic.      Mouth/Throat:      Mouth: Mucous membranes are moist.   Eyes:      Conjunctiva/sclera: Conjunctivae normal.   Cardiovascular:      Rate and Rhythm: Normal rate and regular rhythm.      Comments: Right foot warm palpable DP pulse  Pulmonary:      Effort: Pulmonary effort is normal.   Abdominal:      Palpations: Abdomen is soft.   Skin:     General: Skin is warm and dry.      Findings: Lesion present.      Comments: Previous amputated toes right foot    Right lateral foot wound with bone exposure without foul odor or pus pink granulation tissue    Right groin incision nontender without cellulitis minimum serous drainage   Neurological:      Mental Status: He is alert and oriented to person, place, and time.   Psychiatric:         Mood and Affect: Mood normal.           Assessment:       1. PVD (peripheral vascular disease)    2. Osteomyelitis, unspecified site, unspecified type        Plan:         Right foot ostectomy/foot debridement at [facility] Wednesday March 6th per Dr. Davis for clinical osteo myelitis right foot    Possible I/D RIGHT femoral incision  clindamycin\    Hold Ozempic preop hold Eliquis 48 hours prior to surgery  "

## 2024-03-06 ENCOUNTER — TELEPHONE (OUTPATIENT)
Dept: VASCULAR SURGERY | Facility: CLINIC | Age: 69
End: 2024-03-06
Payer: OTHER GOVERNMENT

## 2024-03-06 ENCOUNTER — OUTSIDE PLACE OF SERVICE (OUTPATIENT)
Dept: VASCULAR SURGERY | Facility: CLINIC | Age: 69
End: 2024-03-06
Payer: OTHER GOVERNMENT

## 2024-03-06 DIAGNOSIS — M86.9 OSTEOMYELITIS, UNSPECIFIED SITE, UNSPECIFIED TYPE: Primary | ICD-10-CM

## 2024-03-06 PROCEDURE — 11044 DBRDMT BONE 1ST 20 SQ CM/<: CPT | Mod: 78,RT,, | Performed by: SURGERY

## 2024-03-06 RX ORDER — HYDROCODONE BITARTRATE AND ACETAMINOPHEN 7.5; 325 MG/1; MG/1
1 TABLET ORAL EVERY 6 HOURS PRN
Qty: 15 TABLET | Refills: 0 | Status: SHIPPED | OUTPATIENT
Start: 2024-03-06

## 2024-03-06 NOTE — TELEPHONE ENCOUNTER
Vascular surgery    Right foot debridement at [facility] per Dr. Davis today we will discharge home if doing well E scribed Norco [facility] [place]  Resume home medications including Eliquis follow-up in 2 weeks

## 2024-03-18 ENCOUNTER — OFFICE VISIT (OUTPATIENT)
Dept: VASCULAR SURGERY | Facility: CLINIC | Age: 69
End: 2024-03-18
Payer: OTHER GOVERNMENT

## 2024-03-18 ENCOUNTER — HOSPITAL ENCOUNTER (OUTPATIENT)
Dept: RADIOLOGY | Facility: HOSPITAL | Age: 69
Discharge: HOME | End: 2024-03-18
Attending: NURSE PRACTITIONER
Payer: OTHER GOVERNMENT

## 2024-03-18 VITALS — WEIGHT: 255.75 LBS | HEIGHT: 70 IN | BODY MASS INDEX: 36.61 KG/M2

## 2024-03-18 DIAGNOSIS — M86.9 OSTEOMYELITIS, UNSPECIFIED SITE, UNSPECIFIED TYPE: Primary | ICD-10-CM

## 2024-03-18 DIAGNOSIS — M86.9 OSTEOMYELITIS, UNSPECIFIED SITE, UNSPECIFIED TYPE: ICD-10-CM

## 2024-03-18 PROCEDURE — 73620 X-RAY EXAM OF FOOT: CPT | Mod: TC,RT

## 2024-03-18 PROCEDURE — 73620 X-RAY EXAM OF FOOT: CPT | Mod: 26,RT,, | Performed by: RADIOLOGY

## 2024-03-18 PROCEDURE — 99214 OFFICE O/P EST MOD 30 MIN: CPT | Mod: PBBFAC,25 | Performed by: NURSE PRACTITIONER

## 2024-03-18 PROCEDURE — 99214 OFFICE O/P EST MOD 30 MIN: CPT | Mod: S$PBB,,, | Performed by: NURSE PRACTITIONER

## 2024-03-18 NOTE — PROGRESS NOTES
"Subjective:       Patient ID: Heraclio Myers is a 69 y.o. male.      03/18/2024  two week follow-up debridement right foot recent right fem-pop at [facility] complains of left ankle tenderness wound clean no cellulitis no purulence pink healthy granulation tissue      Chief Complaint: Follow-up (2 week f/u)  family history includes Diabetes in his brother and father.  Past Medical History:   Diagnosis Date    Diabetes mellitus type I     Hypertension       Past Surgical History:   Procedure Laterality Date    ANGIOGRAPHY OF LOWER EXTREMITY Bilateral 10/24/2023    Procedure: Angiogram Extremity;  Surgeon: Fauzia Davis MD;  Location: [facility];  Service: General;  Laterality: Bilateral;    CORONARY ARTERY BYPASS GRAFT      PLACEMENT-STENT      TOE AMPUTATION Left     toes       reports that he has been smoking cigarettes. He has never used smokeless tobacco. He reports that he does not drink alcohol and does not use drugs.   HPI  Review of Systems      Objective:      Ht 5' 10" (1.778 m)   Wt 116 kg (255 lb 11.7 oz)   BMI 36.69 kg/m²    Physical Exam      Assessment:       No diagnosis found.      Plan:       Continue Eliquis aspirin  Repack wound daily continue with [place] wound care foot x-ray uric acid follow-up next week after wound care  "

## 2024-03-26 ENCOUNTER — OFFICE VISIT (OUTPATIENT)
Dept: VASCULAR SURGERY | Facility: CLINIC | Age: 69
End: 2024-03-26
Payer: OTHER GOVERNMENT

## 2024-03-26 DIAGNOSIS — T56.0X1A LEAD-INDUCED ACUTE GOUT OF RIGHT ANKLE, INITIAL ENCOUNTER: Primary | ICD-10-CM

## 2024-03-26 DIAGNOSIS — M10.171 LEAD-INDUCED ACUTE GOUT OF RIGHT ANKLE, INITIAL ENCOUNTER: Primary | ICD-10-CM

## 2024-03-26 DIAGNOSIS — I73.9 PVD (PERIPHERAL VASCULAR DISEASE): ICD-10-CM

## 2024-03-26 PROCEDURE — 99213 OFFICE O/P EST LOW 20 MIN: CPT | Mod: PBBFAC | Performed by: NURSE PRACTITIONER

## 2024-03-26 PROCEDURE — 99214 OFFICE O/P EST MOD 30 MIN: CPT | Mod: S$PBB,,, | Performed by: NURSE PRACTITIONER

## 2024-03-26 RX ORDER — COLCHICINE 0.6 MG/1
0.6 TABLET ORAL 2 TIMES DAILY
Qty: 60 TABLET | Refills: 11 | Status: SHIPPED | OUTPATIENT
Start: 2024-03-26 | End: 2024-03-27

## 2024-03-26 NOTE — PROGRESS NOTES
Subjective:       Patient ID: Heraclio Myers is a 69 y.o. male.    Chief Complaint: Follow-up  01/2/2024 right fem-pop at [facility] without signs of infection  Right foot wound improving followed by [place] wound care     01/08/2024 patient has been on p.o. Levaquin for 4 weeks followed by wound care right foot x-ray at [facility] 12/28/2023 findings destruction distal 5th metatarsal lateral aspect with tissue ulceration suggest active osteomyelitis, denies fever chills        01/30/2024 postop ABIs improved to 0.88 on the right preop right KARLY 0.51 left KARLY 0.95  Right foot wound postop healing well pink granulation tissue no signs of purulence or cellulitis right femoral incision express small amount of purulence distal incision appears to be superficial E scribed Bactrim and continue cleaning daily antibacterial soap redressed    02/27/2024  wound check previous right fem-pop January 2nd at [facility] right foot wound followed by [place] wound care clinic recent right 5th toe amputation for chronic osteo myelitis pink healthy granulation tissue to wound however palpable bone exposure at the base no foul odor or purulence    He has completed p.o. antibiotics for superficial right groin surgical site infection incision has improved minimum amount of serous drainage noted    03/18/2024  two week follow-up debridement right foot recent right fem-pop at [facility] complains of left ankle tenderness wound clean no cellulitis no purulence pink healthy granulation tissue    03/26/2024  uric acid level elevated 8.8 no white count no osteomyelitis on plain films x-ray of right foot healthy pink granulation tissue right foot wound healing    family history includes Diabetes in his brother and father.  Past Medical History:   Diagnosis Date    Diabetes mellitus type I     Hypertension       Past Surgical History:   Procedure Laterality Date    ANGIOGRAPHY OF LOWER EXTREMITY Bilateral 10/24/2023    Procedure: Angiogram  Extremity;  Surgeon: Fauzia Davis MD;  Location: [facility];  Service: General;  Laterality: Bilateral;    CORONARY ARTERY BYPASS GRAFT      PLACEMENT-STENT      TOE AMPUTATION Left     toes       reports that he has been smoking cigarettes. He has never used smokeless tobacco. He reports that he does not drink alcohol and does not use drugs.   Follow-up      Review of Systems   Musculoskeletal:         Right ankle joint swelling tenderness         Objective:      There were no vitals taken for this visit.   Physical Exam  Vitals and nursing note reviewed.   Constitutional:       Appearance: Normal appearance.   HENT:      Head: Normocephalic.      Mouth/Throat:      Mouth: Mucous membranes are moist.   Eyes:      Conjunctiva/sclera: Conjunctivae normal.   Cardiovascular:      Rate and Rhythm: Normal rate and regular rhythm.   Pulmonary:      Effort: Pulmonary effort is normal.   Abdominal:      Palpations: Abdomen is soft.   Skin:     General: Skin is warm and dry.      Findings: Lesion present.      Comments: Right lateral foot wound healing looks great pink granulation tissue no signs of infection   Neurological:      Mental Status: He is alert and oriented to person, place, and time.   Psychiatric:         Mood and Affect: Mood normal.           Assessment:       1. Lead-induced acute gout of right ankle, initial encounter    2. PVD (peripheral vascular disease)        Plan:       Colchicine for possible gout continue wet-to-dry dressing with Vashe daily right foot wound patient has appointment with [place] wound care clinic today  Follow-up with me in 1 month or p.r.n. sooner

## 2024-03-27 ENCOUNTER — TELEPHONE (OUTPATIENT)
Dept: VASCULAR SURGERY | Facility: CLINIC | Age: 69
End: 2024-03-27
Payer: OTHER GOVERNMENT

## 2024-03-27 RX ORDER — COLCHICINE 0.6 MG/1
0.6 TABLET ORAL DAILY
Qty: 30 TABLET | Refills: 11 | Status: SHIPPED | OUTPATIENT
Start: 2024-03-27 | End: 2025-03-27

## 2024-03-27 NOTE — TELEPHONE ENCOUNTER
Vascular Surgery    Received message patient would like colchicine to be [facility] the [facility] at [facility]    Canceled E prescription to the pharmacy at [facility] and E scribed colchicine to [facility] [place] per patient request

## 2024-04-08 PROBLEM — Z09 POSTOP CHECK: Status: RESOLVED | Noted: 2024-01-02 | Resolved: 2024-04-08

## 2024-04-23 ENCOUNTER — OFFICE VISIT (OUTPATIENT)
Dept: VASCULAR SURGERY | Facility: CLINIC | Age: 69
End: 2024-04-23
Payer: OTHER GOVERNMENT

## 2024-04-23 VITALS — BODY MASS INDEX: 36.61 KG/M2 | HEIGHT: 70 IN | WEIGHT: 255.75 LBS

## 2024-04-23 DIAGNOSIS — I73.9 PVD (PERIPHERAL VASCULAR DISEASE): Primary | ICD-10-CM

## 2024-04-23 PROCEDURE — 99214 OFFICE O/P EST MOD 30 MIN: CPT | Mod: S$PBB,,, | Performed by: NURSE PRACTITIONER

## 2024-04-23 PROCEDURE — 99214 OFFICE O/P EST MOD 30 MIN: CPT | Mod: PBBFAC | Performed by: NURSE PRACTITIONER

## 2024-04-23 NOTE — PROGRESS NOTES
Subjective:       Patient ID: Heraclio Myers is a 69 y.o. male.    Chief Complaint: Follow-up (4 week follow up)  01/2/2024 right fem-pop at [facility]  Incisions without signs of infection  Right foot wound improving followed by [place] wound care     01/08/2024 patient has been on p.o. Levaquin for 4 weeks followed by wound care right foot x-ray at [facility] 12/28/2023 findings destruction distal 5th metatarsal lateral aspect with tissue ulceration suggest active osteomyelitis, denies fever chills        01/30/2024 postop ABIs improved to 0.88 on the right preop right KARLY 0.51 left KARLY 0.95  Right foot wound postop healing well pink granulation tissue no signs of purulence or cellulitis right femoral incision express small amount of purulence distal incision appears to be superficial E scribed Bactrim and continue cleaning daily antibacterial soap redressed    02/27/2024  wound check previous right fem-pop January 2nd at [facility] right foot wound followed by [place] wound care clinic recent right 5th toe amputation for chronic osteo myelitis pink healthy granulation tissue to wound however palpable bone exposure at the base no foul odor or purulence    He has completed p.o. antibiotics for superficial right groin surgical site infection incision has improved minimum amount of serous drainage noted    03/18/2024  two week follow-up debridement right foot recent right fem-pop at [facility] complains of left ankle tenderness wound clean no cellulitis no purulence pink healthy granulation tissue    03/26/2024  uric acid level elevated 8.8 no white count no osteomyelitis on plain films x-ray of right foot healthy pink granulation tissue right foot wound healing    04/23/2024  wound check right foot lateral wound is healed no signs of infection, he has been dismissed from [facility] wound care he reports gout much improved with colchicine    family history includes Diabetes in his brother and father.  Past Medical History:  "  Diagnosis Date    Diabetes mellitus type I     Hypertension       Past Surgical History:   Procedure Laterality Date    ANGIOGRAPHY OF LOWER EXTREMITY Bilateral 10/24/2023    Procedure: Angiogram Extremity;  Surgeon: Fauzia Davis MD;  Location: [facility];  Service: General;  Laterality: Bilateral;    CORONARY ARTERY BYPASS GRAFT      PLACEMENT-STENT      TOE AMPUTATION Left     toes       reports that he has been smoking cigarettes. He has never used smokeless tobacco. He reports that he does not drink alcohol and does not use drugs.   Follow-up      Review of Systems   Cardiovascular:  Positive for leg swelling. Negative for claudication.         Objective:      Ht 5' 10" (1.778 m)   Wt 116 kg (255 lb 11.7 oz)   BMI 36.69 kg/m²    Physical Exam  Vitals and nursing note reviewed.   Constitutional:       Appearance: Normal appearance.   HENT:      Head: Normocephalic.      Mouth/Throat:      Mouth: Mucous membranes are moist.   Eyes:      Conjunctiva/sclera: Conjunctivae normal.   Cardiovascular:      Rate and Rhythm: Normal rate and regular rhythm.      Comments: Palpable right DP pulse  Pulmonary:      Effort: Pulmonary effort is normal.   Abdominal:      Palpations: Abdomen is soft.   Skin:     General: Skin is warm and dry.      Comments: Right foot lateral wound healed with linear scab no signs of infection previous toe amputations   Neurological:      Mental Status: He is alert and oriented to person, place, and time.   Psychiatric:         Mood and Affect: Mood normal.           Assessment:       1. PVD (peripheral vascular disease)        Plan:       Keep scheduled appointment for ABIs in July refill Eliquis call or return sooner p.r.n.      "

## 2024-07-22 ENCOUNTER — OFFICE VISIT (OUTPATIENT)
Dept: VASCULAR SURGERY | Facility: CLINIC | Age: 69
End: 2024-07-22
Payer: OTHER GOVERNMENT

## 2024-07-22 ENCOUNTER — HOSPITAL ENCOUNTER (OUTPATIENT)
Dept: RADIOLOGY | Facility: HOSPITAL | Age: 69
Discharge: HOME | End: 2024-07-22
Attending: NURSE PRACTITIONER
Payer: OTHER GOVERNMENT

## 2024-07-22 VITALS — HEIGHT: 70 IN | WEIGHT: 255.75 LBS | BODY MASS INDEX: 36.61 KG/M2

## 2024-07-22 DIAGNOSIS — Z09 POSTOP CHECK: ICD-10-CM

## 2024-07-22 DIAGNOSIS — I73.9 PVD (PERIPHERAL VASCULAR DISEASE): Primary | ICD-10-CM

## 2024-07-22 PROCEDURE — 99214 OFFICE O/P EST MOD 30 MIN: CPT | Mod: PBBFAC,25 | Performed by: NURSE PRACTITIONER

## 2024-07-22 PROCEDURE — 99214 OFFICE O/P EST MOD 30 MIN: CPT | Mod: S$PBB,,, | Performed by: NURSE PRACTITIONER

## 2024-07-22 PROCEDURE — 93923 UPR/LXTR ART STDY 3+ LVLS: CPT | Mod: TC

## 2024-07-22 PROCEDURE — 93923 UPR/LXTR ART STDY 3+ LVLS: CPT | Mod: 26,,, | Performed by: SURGERY

## 2024-07-22 PROCEDURE — 99999 PR PBB SHADOW E&M-EST. PATIENT-LVL IV: CPT | Mod: PBBFAC,,, | Performed by: NURSE PRACTITIONER

## 2024-07-22 NOTE — PROGRESS NOTES
Assessment   1  Encounter for preventive health examination (V70 0) (Z00 00)  2  Hypothyroidism (244 9) (E03 9)    Discussion/Summary    Anticipatory guidance re: safety, diet, and exercise  Blood work to obtained  F/U in 3 months   If any questions or concerns call office  Possible side effects of new medications were reviewed with the patient/guardian today1  The treatment plan was reviewed with the patient/guardian  The patient/guardian understands and agrees with the treatment plan1        1 Amended By: Cayla Perla; Jun 26 2017 9:08 AM EST    Chief Complaint  CC patient is here for physical for work  No complaints  History of Present Illness  HPI: Shelley Herring is here to today for a routine physical  She was last seen in May by Dr Juno Diaz and was ordered blood work  She is going this Friday for her lab work and does have Hypothyroidism and has been having flare ups of her Eczema on her B/L hands  She is prescribed cream for it and has been using it but states it has been getting slightly worse  She has recently been pregnant with delivering her son in December of 2016  She is having normal menses and is currently not taking any birth control  She last was seen at the [facility] around 6 weeks ago for a routine exam  Otherwise she has no complaints at present time and is taking all her medication as prescribed  Review of Systems    Constitutional: No fever, no chills, feels well, no tiredness, no recent weight gain or weight loss  Eyes: No complaints of eye pain, no red eyes, no eyesight problems, no discharge, no dry eyes, no itching of eyes  ENT: no complaints of earache, no loss of hearing, no nose bleeds, no nasal discharge, no sore throat, no hoarseness  Cardiovascular: No complaints of slow heart rate, no fast heart rate, no chest pain, no palpitations, no leg claudication, no lower extremity edema     Respiratory: No complaints of shortness of breath, no wheezing, no cough, no SOB on Subjective:       Patient ID: Heraclio Myers is a 69 y.o. male.    Chief Complaint: Peripheral Vascular Disease  01/2/2024 right fem-pop at [facility]  Incisions without signs of infection  Right foot wound improving followed by [place] [facility]     01/08/2024 patient has been on p.o. Levaquin for 4 weeks followed by wound care right foot x-ray at [facility] 12/28/2023 findings destruction distal 5th metatarsal lateral aspect with tissue ulceration suggest active osteomyelitis, denies fever chills        01/30/2024 postop ABIs improved to 0.88 on the right preop right KARLY 0.51 left KARLY 0.95  Right foot wound postop healing well pink granulation tissue no signs of purulence or cellulitis right femoral incision express small amount of purulence distal incision appears to be superficial E scribed Bactrim and continue cleaning daily antibacterial soap redressed    02/27/2024  wound check previous right fem-pop January 2nd at [facility] right foot wound followed by [place] [facility] clinic recent right 5th toe amputation for chronic osteo myelitis pink healthy granulation tissue to wound however palpable bone exposure at the base no foul odor or purulence    He has completed p.o. antibiotics for superficial right groin surgical site infection incision has improved minimum amount of serous drainage noted    03/18/2024  two week follow-up debridement right foot recent right fem-pop at [facility] complains of left ankle tenderness wound clean no cellulitis no purulence pink healthy granulation tissue    03/26/2024  uric acid level elevated 8.8 no white count no osteomyelitis on plain films x-ray of right foot healthy pink granulation tissue right foot wound healing    04/23/2024  wound check right foot lateral wound is healed no signs of infection, he has been dismissed from [facility] wound care he reports gout much improved with colchicine    07/22/2024  previous right fem-pop at [facility] by Dr. Davis January 20, 2024  "six-month follow-up ABIs right KARLY staples 0.88 left KARLY 0.94 right foot wound is totally healed previous toe amputation site  family history includes Diabetes in his brother and father.  Past Medical History:   Diagnosis Date    Diabetes mellitus type I     Hypertension       Past Surgical History:   Procedure Laterality Date    ANGIOGRAPHY OF LOWER EXTREMITY Bilateral 10/24/2023    Procedure: Angiogram Extremity;  Surgeon: Fauzia Davis MD;  Location: [facility];  Service: General;  Laterality: Bilateral;    CORONARY ARTERY BYPASS GRAFT      PLACEMENT-STENT      TOE AMPUTATION Left     toes       reports that he has been smoking cigarettes. He has never used smokeless tobacco. He reports that he does not drink alcohol and does not use drugs.   HPI  Review of Systems   Integumentary:  Negative for wound.         Objective:      Ht 5' 10" (1.778 m)   Wt 116 kg (255 lb 11.7 oz)   BMI 36.69 kg/m²    Physical Exam  Vitals and nursing note reviewed.   Constitutional:       Appearance: Normal appearance.   HENT:      Head: Normocephalic.      Mouth/Throat:      Mouth: Mucous membranes are moist.   Eyes:      Conjunctiva/sclera: Conjunctivae normal.   Cardiovascular:      Rate and Rhythm: Normal rate and regular rhythm.   Pulmonary:      Effort: Pulmonary effort is normal.   Abdominal:      Palpations: Abdomen is soft.   Musculoskeletal:      Comments: Previous amputated toes on right foot well healed   Skin:     General: Skin is warm and dry.   Neurological:      Mental Status: He is alert and oriented to person, place, and time.   Psychiatric:         Mood and Affect: Mood normal.           Assessment:       1. PVD (peripheral vascular disease)        Plan:       Educated on KARLY results continue Eliquis aspirin daily follow-up in 1 year with ABIs      " exertion, no orthopnea, no PND  Gastrointestinal: No complaints of abdominal pain, no constipation, no nausea or vomiting, no diarrhea, no bloody stools  Genitourinary: No complaints of dysuria, no incontinence, no pelvic pain, no dysmenorrhea, no vaginal discharge or bleeding  Integumentary: No complaints of skin rash or lesions, no itching, no skin wounds, no breast pain or lump  Neurological: No complaints of headache, no confusion, no convulsions, no numbness, no dizziness or fainting, no tingling, no limb weakness, no difficulty walking  Psychiatric: Not suicidal, no sleep disturbance, no anxiety or depression, no change in personality, no emotional problems  Endocrine: No complaints of proptosis, no hot flashes, no muscle weakness, no deepening of the voice, no feelings of weakness  Hematologic/Lymphatic: No complaints of swollen glands, no swollen glands in the neck, does not bleed easily, does not bruise easily  ROS reviewed  Active Problems   1  Allergic rhinitis (477 9) (J30 9)  2  Asthma (493 90) (J45 909)  3  Eczema (692 9) (L30 9)  4  Hypothyroidism (244 9) (E03 9)  5  Mild intermittent asthma without complication (205 57) (K60 74)    Surgical History    · Denied: History Of Prior Surgery    Family History  Sister    · Family history of malignant neoplasm (V16 9) (Z80 9)  Paternal Grandfather    · Family history of diabetes mellitus (V18 0) (Z83 3)    Social History    · Current every day smoker (305 1) (F17 200)   ·     Current Meds  1  Betamethasone Dipropionate Aug 0 05 % External Cream; APPLY AND GENTLY   MASSAGE INTO AFFECTED AREA(S) TWICE DAILY AS NEEDED; Therapy: [id number] to (Last Rx:[id number])  Requested for: 93ECG4045 Ordered  2  Levothyroxine Sodium 50 MCG Oral Tablet; Take 1 tablet daily  Requested for:   46DJK1575; Last GO:[id number] Ordered  3   ProAir  (90 Base) MCG/ACT Inhalation Aerosol Solution; INHALE 2 PUFFS   EVERY 4 HOURS AS NEEDED  Requested for: 56MZQ1347; Last Rx:[id number]   Ordered  4  Triamcinolone Acetonide 0 1 % External Cream; APPLY A THIN LAYER TO AFFECTED   AREA(S) TWICE DAILY  Requested for: 29GBT1488; Last Rx:[id number] Ordered    Allergies   1  Animal dander - Cats  2  Animal dander - Dogs  3  Nuts  4  Seasonal    Vitals   Recorded: 26Jun2017 08:10AM   Temperature 98 2 F, Temporal   Heart Rate 82   Respiration 16   Systolic 174, RUE, Sitting   Diastolic 64, RUE, Sitting   Height 5 ft 5 in   Weight 169 lb    BMI Calculated 28 12   BSA Calculated 1 84   O2 Saturation 98     Physical Exam    Constitutional   General appearance: No acute distress, well appearing and well nourished  Head and Face   Head and face: Normal     Palpation of the face and sinuses: No sinus tenderness  Eyes   Conjunctiva and lids: No swelling, erythema or discharge  Pupils and irises: Equal, round, reactive to light  Ophthalmoscopic examination: Normal fundi and optic discs  Ears, Nose, Mouth, and Throat   External inspection of ears and nose: Normal     Otoscopic examination: Tympanic membranes translucent with normal light reflex  Canals patent without erythema  Hearing: Normal     Nasal mucosa, septum, and turbinates: Normal without edema or erythema  Lips, teeth, and gums: Normal, good dentition  Oropharynx: Normal with no erythema, edema, exudate or lesions  Neck   Neck: Supple, symmetric, trachea midline, no masses  Thyroid: Normal, no thyromegaly  Pulmonary   Respiratory effort: No increased work of breathing or signs of respiratory distress  Palpation of chest: Normal     Auscultation of lungs: Clear to auscultation  Cardiovascular   Palpation of heart: Normal PMI, no thrills  Auscultation of heart: Normal rate and rhythm, normal S1 and S2, no murmurs  Carotid pulses: 2+ bilaterally  Abdomen   Abdomen: Non-tender, no masses      Musculoskeletal   Gait and station: Normal     Skin   Skin and subcutaneous tissue: Normal without rashes or lesions  Psychiatric   Judgment and insight: Normal     Orientation to person, place, and time: Normal     Recent and remote memory: Intact      Mood and affect: Normal        Signatures   Electronically signed by : Phoebe Oseguera, ; Jun 26 2017  9:14AM EST                       (Author)    Electronically signed by : ASHLY Ritter ; Jun 27 2017  7:05AM EST                       (Co-author)

## 2024-12-10 ENCOUNTER — OFFICE VISIT (OUTPATIENT)
Dept: VASCULAR SURGERY | Facility: CLINIC | Age: 69
End: 2024-12-10
Payer: OTHER GOVERNMENT

## 2024-12-10 VITALS — HEIGHT: 70 IN | WEIGHT: 249 LBS | BODY MASS INDEX: 35.65 KG/M2

## 2024-12-10 DIAGNOSIS — M86.9 OSTEOMYELITIS, UNSPECIFIED SITE, UNSPECIFIED TYPE: ICD-10-CM

## 2024-12-10 DIAGNOSIS — I73.9 PVD (PERIPHERAL VASCULAR DISEASE): Primary | ICD-10-CM

## 2024-12-10 PROCEDURE — 99214 OFFICE O/P EST MOD 30 MIN: CPT | Mod: PBBFAC | Performed by: NURSE PRACTITIONER

## 2024-12-10 PROCEDURE — 99999 PR PBB SHADOW E&M-EST. PATIENT-LVL IV: CPT | Mod: PBBFAC,,, | Performed by: NURSE PRACTITIONER

## 2024-12-10 PROCEDURE — 99214 OFFICE O/P EST MOD 30 MIN: CPT | Mod: S$PBB,,, | Performed by: NURSE PRACTITIONER

## 2024-12-10 RX ORDER — CLINDAMYCIN HYDROCHLORIDE 300 MG/1
300 CAPSULE ORAL 3 TIMES DAILY
Qty: 21 CAPSULE | Refills: 0 | Status: SHIPPED | OUTPATIENT
Start: 2024-12-10

## 2024-12-17 ENCOUNTER — TELEPHONE (OUTPATIENT)
Dept: SURGERY | Facility: HOSPITAL | Age: 69
End: 2024-12-17
Payer: OTHER GOVERNMENT

## 2024-12-17 NOTE — TELEPHONE ENCOUNTER
Vascular surgery    Norco 7.5mg E scribed to [facility] toe amp at [facility] today dr chapin

## 2024-12-30 ENCOUNTER — OFFICE VISIT (OUTPATIENT)
Dept: VASCULAR SURGERY | Facility: CLINIC | Age: 69
End: 2024-12-30
Payer: OTHER GOVERNMENT

## 2024-12-30 VITALS — HEIGHT: 70 IN | WEIGHT: 246.94 LBS | BODY MASS INDEX: 35.35 KG/M2

## 2024-12-30 DIAGNOSIS — Z09 POSTOP CHECK: Primary | ICD-10-CM

## 2024-12-30 PROCEDURE — 99024 POSTOP FOLLOW-UP VISIT: CPT | Mod: ,,, | Performed by: NURSE PRACTITIONER

## 2024-12-30 PROCEDURE — 99999 PR PBB SHADOW E&M-EST. PATIENT-LVL IV: CPT | Mod: PBBFAC,,, | Performed by: NURSE PRACTITIONER

## 2024-12-30 PROCEDURE — 99214 OFFICE O/P EST MOD 30 MIN: CPT | Mod: PBBFAC | Performed by: NURSE PRACTITIONER

## 2025-01-17 ENCOUNTER — TELEPHONE (OUTPATIENT)
Dept: VASCULAR SURGERY | Facility: CLINIC | Age: 70
End: 2025-01-17
Payer: OTHER GOVERNMENT

## 2025-01-30 ENCOUNTER — OFFICE VISIT (OUTPATIENT)
Dept: VASCULAR SURGERY | Facility: CLINIC | Age: 70
End: 2025-01-30
Payer: OTHER GOVERNMENT

## 2025-01-30 VITALS — BODY MASS INDEX: 37.22 KG/M2 | HEIGHT: 70 IN | WEIGHT: 260 LBS

## 2025-01-30 DIAGNOSIS — Z09 POSTOP CHECK: Primary | ICD-10-CM

## 2025-01-30 PROCEDURE — 99214 OFFICE O/P EST MOD 30 MIN: CPT | Mod: PBBFAC | Performed by: NURSE PRACTITIONER

## 2025-01-30 PROCEDURE — 99999 PR PBB SHADOW E&M-EST. PATIENT-LVL IV: CPT | Mod: PBBFAC,,, | Performed by: NURSE PRACTITIONER

## 2025-01-30 PROCEDURE — 99024 POSTOP FOLLOW-UP VISIT: CPT | Mod: ,,, | Performed by: NURSE PRACTITIONER

## 2025-01-30 NOTE — PROGRESS NOTES
"Subjective:       Patient ID: Heraclio Myers is a 69 y.o. male.    Chief Complaint: Follow-up (3 week check)   Two week follow-up wound check no purulent drainage no foul odor wound is slowly healing Established patient with PVD previous Right fem-pop Two week follow-up amputation 3rd and 4th digits right foot for osteomyelitis to complete a trans met amputation previous left trans met amputation  Past medical history diabetes hypertension  family history includes Diabetes in his brother and father.  Past Medical History:   Diagnosis Date    Diabetes mellitus type I     Hypertension       Past Surgical History:   Procedure Laterality Date    ANGIOGRAPHY OF LOWER EXTREMITY Bilateral 10/24/2023    Procedure: Angiogram Extremity;  Surgeon: Fauzia Davis MD;  Location: [facility];  Service: General;  Laterality: Bilateral;    CORONARY ARTERY BYPASS GRAFT      PLACEMENT-STENT      TOE AMPUTATION Left     toes       reports that he has been smoking cigarettes. He has never used smokeless tobacco. He reports that he does not drink alcohol and does not use drugs.   HPI  Review of Systems      Objective:      Ht 5' 10" (1.778 m)   Wt 117.9 kg (260 lb)   BMI 37.31 kg/m²    Physical Exam      Assessment:       1. Postop check        Plan:       Removed some callus skin and suture  Continue to repacked daily wet-to-dry with Vashe follow-up in 3-4 weeks for wound check call or return sooner p.r.n.      " Visit Information Date & Time Provider Department Dept. Phone Encounter #  
 2/22/2017  9:30 AM Kat Mccormack MD [facility]ists at [facility] [phone] Follow-up Instructions Return in about 6 months (around 8/22/2017) for labs 1 week before. Your Appointments 3/27/2017  9:30 AM  
Office Visit with Jayda Sanchez MD  
[facility] Appt Note: check up [address] Harpreet A [address] [place] [id number] [phone] [address] [facility] [address] Upcoming Health Maintenance Date Due Hepatitis C Screening 1956 DTaP/Tdap/Td series (1 - Tdap) 10/4/1976 ZOSTER VACCINE AGE 60> 10/4/2015 INFLUENZA AGE 9 TO ADULT 8/1/2016 COLONOSCOPY 11/9/2025 Allergies as of 2/22/2017  Review Complete On: 2/22/2017 By: Kat Mccormack MD  
  
 Severity Noted Reaction Type Reactions Iron  05/23/2016    Nausea Only Current Immunizations  Reviewed on 12/7/2015 Name Date  
 TB Skin Test (PPD) Intradermal 12/7/2015 Not reviewed this visit You Were Diagnosed With   
  
 Codes Comments Essential hypertension    -  Primary ICD-10-CM: I10 
ICD-9-CM: 401.9 BPH (benign prostatic hypertrophy) with urinary obstruction     ICD-10-CM: N40.1, N13.8 ICD-9-CM: 600.01, 599.69 Acute pain of right shoulder     ICD-10-CM: M25.511 ICD-9-CM: 719.41 Elevated blood sugar     ICD-10-CM: R73.9 ICD-9-CM: 790.29 Vitals BP  
  
  
  
  
  
 152/80 (BP 1 Location: Left arm, BP Patient Position: Sitting) Vitals History BMI and BSA Data Body Mass Index Body Surface Area  
 27.57 kg/m 2 2.21 m 2 Preferred Pharmacy Pharmacy Name Phone [facility] [address] [place] [address], [facility] [phone] Your Updated Medication List  
  
   
This list is accurate as of: 2/22/17 10:13 AM.  Always use your most recent med list.  
  
  
  
 alfuzosin SR 10 mg SR tablet Commonly known as:  UROXATRAL  
TAKE ONE TABLET BY MOUTH ONCE DAILY ASACOL 400 mg EC tablet Generic drug:  mesalamine EC Take 400 mg by mouth three (3) times daily. aspirin 81 mg chewable tablet 81 mg.  
  
 dilTIAZem  mg ER capsule Commonly known as:  CARDIZEM CD Take 240 mg by mouth daily. FLECAINIDE PO Take  by mouth. fluticasone 50 mcg/actuation nasal spray Commonly known as:  Ammy Lansing INSERT TWO SPRAYS IN EACH NOSTRIL EVERY DAY Prescriptions Sent to Pharmacy Refills  
 fluticasone (FLONASE) 50 mcg/actuation nasal spray 4 Sig: INSERT TWO SPRAYS IN EACH NOSTRIL EVERY DAY Class: Normal  
 Pharmacy: [address] [place] [address] #: [phone] Follow-up Instructions Return in about 6 months (around 8/22/2017) for labs 1 week before. Patient Instructions DASH Diet: Care Instructions Your Care Instructions The DASH diet is an eating plan that can help lower your blood pressure. DASH stands for Dietary Approaches to Stop Hypertension. Hypertension is high blood pressure. The DASH diet focuses on eating foods that are high in calcium, potassium, and magnesium. These nutrients can lower blood pressure. The foods that are highest in these nutrients are fruits, vegetables, low-fat dairy products, nuts, seeds, and legumes. But taking calcium, potassium, and magnesium supplements instead of eating foods that are high in those nutrients does not have the same effect. The DASH diet also includes whole grains, fish, and poultry. The DASH diet is one of several lifestyle changes your doctor may recommend to lower your high blood pressure. Your doctor may also want you to decrease the amount of sodium in your diet. Lowering sodium while following the DASH diet can lower blood pressure even further than just the DASH diet alone. Follow-up care is a key part of your treatment and safety. Be sure to make and go to all appointments, and call your doctor if you are having problems. It's also a good idea to know your test results and keep a list of the medicines you take. How can you care for yourself at home? Following the DASH diet · Eat 4 to 5 servings of fruit each day. A serving is 1 medium-sized piece of fruit, ½ cup chopped or canned fruit, 1/4 cup dried fruit, or 4 ounces (½ cup) of fruit juice. Choose fruit more often than fruit juice. · Eat 4 to 5 servings of vegetables each day. A serving is 1 cup of lettuce or raw leafy vegetables, ½ cup of chopped or cooked vegetables, or 4 ounces (½ cup) of vegetable juice. Choose vegetables more often than vegetable juice. · Get 2 to 3 servings of low-fat and fat-free dairy each day. A serving is 8 ounces of milk, 1 cup of yogurt, or 1 ½ ounces of cheese. · Eat 6 to 8 servings of grains each day. A serving is 1 slice of bread, 1 ounce of dry cereal, or ½ cup of cooked rice, pasta, or cooked cereal. Try to choose whole-grain products as much as possible. · Limit lean meat, poultry, and fish to 2 servings each day. A serving is 3 ounces, about the size of a deck of cards. · Eat 4 to 5 servings of nuts, seeds, and legumes (cooked dried beans, lentils, and split peas) each week. A serving is 1/3 cup of nuts, 2 tablespoons of seeds, or ½ cup of cooked beans or peas. · Limit fats and oils to 2 to 3 servings each day. A serving is 1 teaspoon of vegetable oil or 2 tablespoons of salad dressing. · Limit sweets and added sugars to 5 servings or less a week. A serving is 1 tablespoon jelly or jam, ½ cup sorbet, or 1 cup of lemonade. · Eat less than 2,300 milligrams (mg) of sodium a day. If you limit your sodium to 1,500 mg a day, you can lower your blood pressure even more. Tips for success · Start small.  Do not try to make dramatic changes to your diet all at once. You might feel that you are missing out on your favorite foods and then be more likely to not follow the plan. Make small changes, and stick with them. Once those changes become habit, add a few more changes. · Try some of the following: ¨ Make it a goal to eat a fruit or vegetable at every meal and at snacks. This will make it easy to get the recommended amount of fruits and vegetables each day. ¨ Try yogurt topped with fruit and nuts for a snack or healthy dessert. ¨ Add lettuce, tomato, cucumber, and onion to sandwiches. ¨ Combine a ready-made pizza crust with low-fat mozzarella cheese and lots of vegetable toppings. Try using tomatoes, squash, spinach, broccoli, carrots, cauliflower, and onions. ¨ Have a variety of cut-up vegetables with a low-fat dip as an appetizer instead of chips and dip. ¨ Sprinkle sunflower seeds or chopped almonds over salads. Or try adding chopped walnuts or almonds to cooked vegetables. ¨ Try some vegetarian meals using beans and peas. Add garbanzo or kidney beans to salads. Make burritos and tacos with mashed mcrae beans or black beans. Where can you learn more? Go to http://luzmaria-bradly.info/. Enter M187 in the search box to learn more about \"DASH Diet: Care Instructions. \" Current as of: March 23, 2016 Content Version: 11.1 © 2214-6941 [employer]. Care instructions adapted under license by [employer] (which disclaims liability or warranty for this information). If you have questions about a medical condition or this instruction, always ask your healthcare professional. [place] [address] any warranty or liability for your use of this information. Rotator Cuff: Exercises Your Care Instructions Here are some examples of typical rehabilitation exercises for your condition. Start each exercise slowly. Ease off the exercise if you start to have pain. Your doctor or physical therapist will tell you when you can start these exercises and which ones will work best for you. How to do the exercises Pendulum swing Note: If you have pain in your back, do not do this exercise. 1. Hold on to a table or the back of a chair with your good arm. Then bend forward a little and let your sore arm hang straight down. This exercise does not use the arm muscles. Rather, use your legs and your hips to create movement that makes your arm swing freely. 2. Use the movement from your hips and legs to guide the slightly swinging arm back and forth like a pendulum (or elephant trunk). Then guide it in circles that start small (about the size of a dinner plate). Make the circles a bit larger each day, as your pain allows. 3. Do this exercise for 5 minutes, 5 to 7 times each day. 4. As you have less pain, try bending over a little farther to do this exercise. This will increase the amount of movement at your shoulder. Posterior stretching exercise 1. Hold the elbow of your injured arm with your other hand. 2. Use your hand to pull your injured arm gently up and across your body. You will feel a gentle stretch across the back of your injured shoulder. 3. Hold for at least 15 to 30 seconds. Then slowly lower your arm. 4. Repeat 2 to 4 times. Up-the-back stretch Note: Your doctor or physical therapist may want you to wait to do this stretch until you have regained most of your range of motion and strength. You can do this stretch in different ways. Hold any of these stretches for at least 15 to 30 seconds. Repeat them 2 to 4 times. 1. Put your hand in your back pocket. Let it rest there to stretch your shoulder. 2. With your other hand, hold your injured arm (palm outward) behind your back by the wrist. Pull your arm up gently to stretch your shoulder. 3. Next, put a towel over your other shoulder.  Put the hand of your injured arm behind your back. Now hold the back end of the towel. With the other hand, hold the front end of the towel in front of your body. Pull gently on the front end of the towel. This will bring your hand farther up your back to stretch your shoulder. Overhead stretch 1. Standing about an arm's length away, grasp onto a solid surface. You could use a countertop, a doorknob, or the back of a sturdy chair. 2. With your knees slightly bent, bend forward with your arms straight. Lower your upper body, and let your shoulders stretch. 3. As your shoulders are able to stretch farther, you may need to take a step or two backward. 4. Hold for at least 15 to 30 seconds. Then stand up and relax. If you had stepped back during your stretch, step forward so you can keep your hands on the solid surface. 5. Repeat 2 to 4 times. Shoulder flexion (lying down) Note: To make a wand for this exercise, use a piece of PVC pipe or a broom handle with the broom removed. Make the wand about a foot wider than your shoulders. 1. Lie on your back, holding a wand with both hands. Your palms should face down as you hold the wand. 2. Keeping your elbows straight, slowly raise your arms over your head. Raise them until you feel a stretch in your shoulders, upper back, and chest. 
3. Hold for 15 to 30 seconds. 4. Repeat 2 to 4 times. Shoulder rotation (lying down) Note: To make a wand for this exercise, use a piece of PVC pipe or a broom handle with the broom removed. Make the wand about a foot wider than your shoulders. 1. Lie on your back. Hold a wand with both hands with your elbows bent and palms up. 2. Keep your elbows close to your body, and move the wand across your body toward the sore arm. 3. Hold for 8 to 12 seconds. 4. Repeat 2 to 4 times. Wall climbing (to the side) Note: Avoid any movement that is straight to your side, and be careful not to arch your back. Your arm should stay about 30 degrees to the front of your side. 1. Stand with your side to a wall so that your fingers can just touch it at an angle about 30 degrees toward the front of your body. 2. Walk the fingers of your injured arm up the wall as high as pain permits. Try not to shrug your shoulder up toward your ear as you move your arm up. 3. Hold that position for a count of at least 15 to 20. 
4. Walk your fingers back down to the starting position. 5. Repeat at least 2 to 4 times. Try to reach higher each time. Wall climbing (to the front) Note: During this stretching exercise, be careful not to arch your back. 1. Face a wall, and stand so your fingers can just touch it. 2. Keeping your shoulder down, walk the fingers of your injured arm up the wall as high as pain permits. (Don't shrug your shoulder up toward your ear.) 3. Hold your arm in that position for at least 15 to 30 seconds. 4. Slowly walk your fingers back down to where you started. 5. Repeat at least 2 to 4 times. Try to reach higher each time. Shoulder blade squeeze 1. Stand with your arms at your sides, and squeeze your shoulder blades together. Do not raise your shoulders up as you squeeze. 2. Hold 6 seconds. 3. Repeat 8 to 12 times. Scapular exercise: Arm reach 1. Lie flat on your back. This exercise is a very slight motion that starts with your arms raised (elbows straight, arms straight). 2. From this position, reach higher toward the kerline or ceiling. Keep your elbows straight. All motion should be from your shoulder blade only. 3. Relax your arms back to where you started. 4. Repeat 8 to 12 times. Arm raise to the side Note: During this strengthening exercise, your arm should stay about 30 degrees to the front of your side. 1. Slowly raise your injured arm to the side, with your thumb facing up. Raise your arm 60 degrees at the most (shoulder level is 90 degrees). 2. Hold the position for 3 to 5 seconds. Then lower your arm back to your side. If you need to, bring your \"good\" arm across your body and place it under the elbow as you lower your injured arm. Use your good arm to keep your injured arm from dropping down too fast. 
3. Repeat 8 to 12 times. 4. When you first start out, don't hold any extra weight in your hand. As you get stronger, you may use a 1-pound to 2-pound dumbbell or a small can of food. Shoulder flexor and extensor exercise Note: These are isometric exercises. That means you contract your muscles without actually moving. · Push forward (flex): Stand facing a wall or doorjamb, about 6 inches or less back. Hold your injured arm against your body. Make a closed fist with your thumb on top. Then gently push your hand forward into the wall with about 25% to 50% of your strength. Don't let your body move backward as you push. Hold for about 6 seconds. Relax for a few seconds. Repeat 8 to 12 times. · Push backward (extend): Stand with your back flat against a wall. Your upper arm should be against the wall, with your elbow bent 90 degrees (your hand straight ahead). Push your elbow gently back against the wall with about 25% to 50% of your strength. Don't let your body move forward as you push. Hold for about 6 seconds. Relax for a few seconds. Repeat 8 to 12 times. Scapular exercise: Wall push-ups Note: This exercise is best done with your fingers somewhat turned out, rather than straight up and down. 1. Stand facing a wall, about 12 inches to 18 inches away. 2. Place your hands on the wall at shoulder height. 3. Slowly bend your elbows and bring your face to the wall. Keep your back and hips straight. 4. Push back to where you started. 5. Repeat 8 to 12 times. 6. When you can do this exercise against a wall comfortably, you can try it against a counter.  You can then slowly progress to the end of a couch, then to a sturdy chair, and finally to the floor. Scapular exercise: Retraction Note: For this exercise, you will need elastic exercise material, such as surgical tubing or Thera-Band. 1. Put the band around a solid object at about waist level. (A bedpost will work well.) Each hand should hold an end of the band. 2. With your elbows at your sides and bent to 90 degrees, pull the band back. Your shoulder blades should move toward each other. Then move your arms back where you started. 3. Repeat 8 to 12 times. 4. If you have good range of motion in your shoulders, try this exercise with your arms lifted out to the sides. Keep your elbows at a 90-degree angle. Raise the elastic band up to about shoulder level. Pull the band back to move your shoulder blades toward each other. Then move your arms back where you started. Internal rotator strengthening exercise 1. Start by tying a piece of elastic exercise material to a doorknob. You can use surgical tubing or Thera-Band. 2. Stand or sit with your shoulder relaxed and your elbow bent 90 degrees. Your upper arm should rest comfortably against your side. Squeeze a rolled towel between your elbow and your body for comfort. This will help keep your arm at your side. 3. Hold one end of the elastic band in the hand of the painful arm. 4. Slowly rotate your forearm toward your body until it touches your belly. Slowly move it back to where you started. 5. Keep your elbow and upper arm firmly tucked against the towel roll or at your side. 6. Repeat 8 to 12 times. External rotator strengthening exercise 1. Start by tying a piece of elastic exercise material to a doorknob. You can use surgical tubing or Thera-Band. (You may also hold one end of the band in each hand.) 2. Stand or sit with your shoulder relaxed and your elbow bent 90 degrees. Your upper arm should rest comfortably against your side.  Squeeze a rolled towel between your elbow and your body for comfort. This will help keep your arm at your side. 3. Hold one end of the elastic band with the hand of the painful arm. 4. Start with your forearm across your belly. Slowly rotate the forearm out away from your body. Keep your elbow and upper arm tucked against the towel roll or the side of your body until you begin to feel tightness in your shoulder. Slowly move your arm back to where you started. 5. Repeat 8 to 12 times. Follow-up care is a key part of your treatment and safety. Be sure to make and go to all appointments, and call your doctor if you are having problems. It's also a good idea to know your test results and keep a list of the medicines you take. Where can you learn more? Go to http://luzmaria-bradly.info/. Enter Velia Smyth in the search box to learn more about \"Rotator Cuff: Exercises. \" Current as of: May 23, 2016 Content Version: 11.1 © 9032-6630 [employer]. Care instructions adapted under license by [employer] (which disclaims liability or warranty for this information). If you have questions about a medical condition or this instruction, always ask your healthcare professional. [address] any warranty or liability for your use of this information. Introducing \[facility] [place] [facility]\"" & HEALTH SERVICES! Katiana Ruano introduces Drivable patient portal. Now you can access parts of your medical record, email your doctor's office, and request medication refills online. 1. In your internet browser, go to [email or web address] 2. Click on the First Time User? Click Here link in the Sign In box. You will see the New Member Sign Up page. 3. Enter your Drivable Access Code exactly as it appears below. You will not need to use this code after youve completed the sign-up process. If you do not sign up before the expiration date, you must request a new code. · ViaView Access Code: [email or web address] Expires: 3/5/2017 10:54 AM 
 
4. Enter the last four digits of your Social Security Number (xxxx) and Date of Birth (mm/dd/yyyy) as indicated and click Submit. You will be taken to the next sign-up page. 5. Create a ViaView ID. This will be your ViaView login ID and cannot be changed, so think of one that is secure and easy to remember. 6. Create a ViaView password. You can change your password at any time. 7. Enter your Password Reset Question and Answer. This can be used at a later time if you forget your password. 8. Enter your e-mail address. You will receive e-mail notification when new information is available in [address] 9. Click Sign Up. You can now view and download portions of your medical record. 10. Click the Download Summary menu link to download a portable copy of your medical information. If you have questions, please visit the Frequently Asked Questions section of the ViaView website. Remember, ViaView is NOT to be used for urgent needs. For medical emergencies, dial 911. Now available from your iPhone and Android! Please provide this summary of care documentation to your next provider. Your primary care clinician is listed as YASMANI MORALES. If you have any questions after today's visit, please call [phone].

## 2025-02-27 ENCOUNTER — OFFICE VISIT (OUTPATIENT)
Dept: VASCULAR SURGERY | Facility: CLINIC | Age: 70
End: 2025-02-27
Payer: OTHER GOVERNMENT

## 2025-02-27 VITALS
HEART RATE: 94 BPM | OXYGEN SATURATION: 100 % | BODY MASS INDEX: 36.79 KG/M2 | DIASTOLIC BLOOD PRESSURE: 67 MMHG | HEIGHT: 70 IN | SYSTOLIC BLOOD PRESSURE: 121 MMHG | WEIGHT: 257 LBS

## 2025-02-27 DIAGNOSIS — I73.9 PVD (PERIPHERAL VASCULAR DISEASE): Primary | ICD-10-CM

## 2025-02-27 PROCEDURE — 99214 OFFICE O/P EST MOD 30 MIN: CPT | Mod: PBBFAC | Performed by: SURGERY

## 2025-02-27 PROCEDURE — 99999 PR PBB SHADOW E&M-EST. PATIENT-LVL IV: CPT | Mod: PBBFAC,,, | Performed by: SURGERY

## 2025-02-27 PROCEDURE — 99212 OFFICE O/P EST SF 10 MIN: CPT | Mod: S$PBB,,, | Performed by: SURGERY

## 2025-02-27 NOTE — PROGRESS NOTES
Vascular surgery     Foot wounds in the right completely healed.  Does have on 2 toes probably the 3rd and 4th what he thinks his calluses prior little bit of residual nail not growing into the skin in his otherwise not bothering him educated follow up 6 months

## 2025-05-13 ENCOUNTER — OUTSIDE PLACE OF SERVICE (OUTPATIENT)
Dept: VASCULAR SURGERY | Facility: CLINIC | Age: 70
End: 2025-05-13
Payer: OTHER GOVERNMENT

## 2025-05-13 PROCEDURE — 99222 1ST HOSP IP/OBS MODERATE 55: CPT | Mod: ,,, | Performed by: SURGERY

## 2025-05-22 ENCOUNTER — OUTSIDE PLACE OF SERVICE (OUTPATIENT)
Dept: VASCULAR SURGERY | Facility: CLINIC | Age: 70
End: 2025-05-22
Payer: OTHER GOVERNMENT

## 2025-06-06 ENCOUNTER — TELEPHONE (OUTPATIENT)
Dept: VASCULAR SURGERY | Facility: CLINIC | Age: 70
End: 2025-06-06
Payer: OTHER GOVERNMENT

## 2025-06-06 DIAGNOSIS — I73.9 PVD (PERIPHERAL VASCULAR DISEASE): Primary | ICD-10-CM

## 2025-06-06 RX ORDER — HYDROCODONE BITARTRATE AND ACETAMINOPHEN 7.5; 325 MG/1; MG/1
1 TABLET ORAL EVERY 6 HOURS PRN
Qty: 15 TABLET | Refills: 0 | Status: SHIPPED | OUTPATIENT
Start: 2025-06-06

## 2025-06-19 ENCOUNTER — OFFICE VISIT (OUTPATIENT)
Dept: VASCULAR SURGERY | Facility: CLINIC | Age: 70
End: 2025-06-19
Payer: OTHER GOVERNMENT

## 2025-06-19 VITALS — HEIGHT: 70 IN | BODY MASS INDEX: 35.93 KG/M2 | WEIGHT: 251 LBS

## 2025-06-19 DIAGNOSIS — I73.9 PVD (PERIPHERAL VASCULAR DISEASE): Primary | ICD-10-CM

## 2025-06-19 DIAGNOSIS — M86.9 OSTEOMYELITIS, UNSPECIFIED SITE, UNSPECIFIED TYPE: ICD-10-CM

## 2025-06-19 PROCEDURE — 99999 PR PBB SHADOW E&M-EST. PATIENT-LVL IV: CPT | Mod: PBBFAC,,, | Performed by: NURSE PRACTITIONER

## 2025-06-19 PROCEDURE — 99214 OFFICE O/P EST MOD 30 MIN: CPT | Mod: PBBFAC | Performed by: NURSE PRACTITIONER

## 2025-06-19 PROCEDURE — 99214 OFFICE O/P EST MOD 30 MIN: CPT | Mod: S$PBB,,, | Performed by: NURSE PRACTITIONER

## 2025-06-19 RX ORDER — HYDROCODONE BITARTRATE AND ACETAMINOPHEN 7.5; 325 MG/1; MG/1
1 TABLET ORAL EVERY 6 HOURS PRN
Qty: 15 TABLET | Refills: 0 | Status: SHIPPED | OUTPATIENT
Start: 2025-06-19

## 2025-06-19 NOTE — PROGRESS NOTES
"Subjective:       Patient ID: Heraclio Myers is a 70 y.o. male.    Chief Complaint: Follow-up  Postop visit proximally 4 weeks out from debridement dorsum right foot for abscess osteomyelitis, no growth on cultures previous amputated toes on that foot and right fem-pop  He is currently receiving home IV antibiotics via left upper extremity PICC  With home health and home wound VAC  Right foot wound is healing no purulent drainage no cellulitis proximally 4 x 2 cm with pink granulation tissue some slough at wound edges  family history includes Diabetes in his brother and father.  Past Medical History:   Diagnosis Date    Diabetes mellitus type I     Hypertension       Past Surgical History:   Procedure Laterality Date    ANGIOGRAPHY OF LOWER EXTREMITY Bilateral 10/24/2023    Procedure: Angiogram Extremity;  Surgeon: Fauzia Davis MD;  Location: [facility];  Service: General;  Laterality: Bilateral;    CORONARY ARTERY BYPASS GRAFT      PLACEMENT-STENT      TOE AMPUTATION Left     toes       reports that he has been smoking cigarettes. He has never used smokeless tobacco. He reports that he does not drink alcohol and does not use drugs.   HPI  Review of Systems   Constitutional:  Negative for fever.   Gastrointestinal:  Negative for nausea and vomiting.   Integumentary:  Positive for wound.         Objective:      Ht 5' 10" (1.778 m)   Wt 113.9 kg (251 lb)   BMI 36.01 kg/m²    Physical Exam  Vitals and nursing note reviewed.   Constitutional:       Appearance: Normal appearance.   HENT:      Head: Normocephalic.      Mouth/Throat:      Mouth: Mucous membranes are moist.   Eyes:      Conjunctiva/sclera: Conjunctivae normal.   Cardiovascular:      Rate and Rhythm: Normal rate and regular rhythm.   Pulmonary:      Effort: Pulmonary effort is normal.   Abdominal:      Palpations: Abdomen is soft.   Musculoskeletal:      Comments: Previous toe amputations left foot    Healed right great toe amputation site    Amputation " site wound clean with bloody drainage no cellulitis no necrotic tissue no purulent drainage       Skin:     General: Skin is warm and dry.      Comments: Dorsum right foot wound proximally 4 by 2 cm no cellulitis no purulent drainage pink granulation tissue with wound edges with some slough   Neurological:      Mental Status: He is alert and oriented to person, place, and time.   Psychiatric:         Mood and Affect: Mood normal.           Assessment:       1. PVD (peripheral vascular disease)    2. Osteomyelitis, unspecified site, unspecified type        Plan:       Complete course of home IV antibiotics for proximally 2 more weeks  Continue negative wound pressure therapy with home health twice a week  Follow-up in 3-4 weeks wound check  Norco 7.5 mg E scribed per patient request

## 2025-07-16 ENCOUNTER — OFFICE VISIT (OUTPATIENT)
Dept: VASCULAR SURGERY | Facility: CLINIC | Age: 70
End: 2025-07-16
Payer: OTHER GOVERNMENT

## 2025-07-16 ENCOUNTER — HOSPITAL ENCOUNTER (OUTPATIENT)
Dept: RADIOLOGY | Facility: HOSPITAL | Age: 70
Discharge: HOME | End: 2025-07-16
Attending: NURSE PRACTITIONER
Payer: OTHER GOVERNMENT

## 2025-07-16 VITALS — HEIGHT: 70 IN | BODY MASS INDEX: 35.65 KG/M2 | WEIGHT: 249 LBS

## 2025-07-16 DIAGNOSIS — I73.9 PERIPHERAL VASCULAR DISEASE, UNSPECIFIED: Primary | ICD-10-CM

## 2025-07-16 DIAGNOSIS — F17.200 SMOKER: ICD-10-CM

## 2025-07-16 DIAGNOSIS — I73.9 PVD (PERIPHERAL VASCULAR DISEASE): ICD-10-CM

## 2025-07-16 PROCEDURE — 93925 LOWER EXTREMITY STUDY: CPT | Mod: 26,,, | Performed by: SURGERY

## 2025-07-16 PROCEDURE — 99214 OFFICE O/P EST MOD 30 MIN: CPT | Mod: S$PBB,,, | Performed by: NURSE PRACTITIONER

## 2025-07-16 PROCEDURE — 93922 UPR/L XTREMITY ART 2 LEVELS: CPT | Mod: 26,,, | Performed by: SURGERY

## 2025-07-16 PROCEDURE — 99214 OFFICE O/P EST MOD 30 MIN: CPT | Mod: PBBFAC,25 | Performed by: NURSE PRACTITIONER

## 2025-07-16 PROCEDURE — 93922 UPR/L XTREMITY ART 2 LEVELS: CPT | Mod: TC

## 2025-07-16 PROCEDURE — 99999 PR PBB SHADOW E&M-EST. PATIENT-LVL IV: CPT | Mod: PBBFAC,,, | Performed by: NURSE PRACTITIONER

## 2025-07-16 RX ORDER — HYDROCODONE BITARTRATE AND ACETAMINOPHEN 7.5; 325 MG/1; MG/1
1 TABLET ORAL EVERY 6 HOURS PRN
Qty: 15 TABLET | Refills: 0 | Status: SHIPPED | OUTPATIENT
Start: 2025-07-16

## 2025-07-16 NOTE — PROGRESS NOTES
"Subjective:       Patient ID: Heraclio Myers is a 70 y.o. male.    Chief Complaint: Wound Check  Wound check today with ABIs right fem-pop graft occluded, he is on Eliquis, continues to smoke with moderately decreased KARLY on the right 0.57 with right foot wound and complains of right foot pain he is minimally ambulatory uses walker and wheelchair denies calf claudication   8 weeks out from debridement dorsum right foot for abscess osteomyelitis, no growth on cultures previous amputated toes on that foot     He has completed his home IV antibiotics and PICC line has been discontinued right foot wound is healing and wound VAC has been discontinued  He does have new wound for approximately 3 weeks lower right shin noninfected see images in epic        Previous above knee right fem-pop he is on Eliquis  family history includes Diabetes in his brother and father.  Past Medical History:   Diagnosis Date    Diabetes mellitus type I     Hypertension       Past Surgical History:   Procedure Laterality Date    ANGIOGRAPHY OF LOWER EXTREMITY Bilateral 10/24/2023    Procedure: Angiogram Extremity;  Surgeon: Fauzia Davis MD;  Location: [facility], [place];  Service: General;  Laterality: Bilateral;    CORONARY ARTERY BYPASS GRAFT      PLACEMENT-STENT      TOE AMPUTATION Left     toes       reports that he has been smoking cigarettes. He has never used smokeless tobacco. He reports that he does not drink alcohol and does not use drugs.   Wound Check      Review of Systems   Musculoskeletal:         Right foot pain   Integumentary:  Positive for wound.         Objective:      Ht 5' 10" (1.778 m)   Wt 112.9 kg (249 lb)   BMI 35.73 kg/m²    Physical Exam  Vitals and nursing note reviewed.   Constitutional:       Appearance: Normal appearance.   HENT:      Head: Normocephalic.      Mouth/Throat:      Mouth: Mucous membranes are moist.   Eyes:      Conjunctiva/sclera: Conjunctivae normal.   Cardiovascular:      Rate and Rhythm: Normal " rate and regular rhythm.   Pulmonary:      Effort: Pulmonary effort is normal.   Abdominal:      Palpations: Abdomen is soft.   Musculoskeletal:      Comments: Previous toe amputations left foot    Healed right great toe amputation site    Amputation site wound clean with bloody drainage no cellulitis no necrotic tissue no purulent drainage       Skin:     General: Skin is warm and dry.      Comments: Dorsum right foot wound improved approximately 2 by 2 cm no cellulitis no purulent drainage pink granulation tissue with wound edges without any further  slough    New wound lower right shin area superficial with small area of dry necrotic tissue see image in epic   Neurological:      Mental Status: He is alert and oriented to person, place, and time.   Psychiatric:         Mood and Affect: Mood normal.           Assessment:       1. Peripheral vascular disease, unspecified    2. Smoker        Plan:    CTA with runoff and follow-up for with Dr. Davis for any possible revascularization options Continue current wound care to right foot with home health  Smoking cessation

## 2025-08-11 ENCOUNTER — HOSPITAL ENCOUNTER (OUTPATIENT)
Dept: RADIOLOGY | Facility: HOSPITAL | Age: 70
Discharge: HOME | End: 2025-08-11
Attending: NURSE PRACTITIONER
Payer: OTHER GOVERNMENT

## 2025-08-11 DIAGNOSIS — I73.9 PERIPHERAL VASCULAR DISEASE, UNSPECIFIED: ICD-10-CM

## 2025-08-11 LAB — CREAT SERPL-MCNC: 1.4 MG/DL (ref 0.6–1.3)

## 2025-08-11 PROCEDURE — 75635 CT ANGIO ABDOMINAL ARTERIES: CPT | Mod: TC

## 2025-08-11 PROCEDURE — 75635 CT ANGIO ABDOMINAL ARTERIES: CPT | Mod: 26,,, | Performed by: RADIOLOGY

## 2025-08-11 PROCEDURE — 82565 ASSAY OF CREATININE: CPT

## 2025-08-11 PROCEDURE — 25500020 PHARM REV CODE 255: Performed by: NURSE PRACTITIONER

## 2025-08-11 RX ORDER — IOPAMIDOL 755 MG/ML
100 INJECTION, SOLUTION INTRAVASCULAR
Status: COMPLETED | OUTPATIENT
Start: 2025-08-11 | End: 2025-08-11

## 2025-08-11 RX ADMIN — IOPAMIDOL 100 ML: 755 INJECTION, SOLUTION INTRAVENOUS at 10:08

## 2025-08-21 ENCOUNTER — OFFICE VISIT (OUTPATIENT)
Dept: VASCULAR SURGERY | Facility: CLINIC | Age: 70
End: 2025-08-21
Payer: OTHER GOVERNMENT

## 2025-08-21 DIAGNOSIS — I73.9 PVD (PERIPHERAL VASCULAR DISEASE): Primary | ICD-10-CM

## 2025-08-21 PROCEDURE — 99212 OFFICE O/P EST SF 10 MIN: CPT | Mod: PBBFAC | Performed by: SURGERY

## 2025-08-21 PROCEDURE — 99999 PR PBB SHADOW E&M-EST. PATIENT-LVL II: CPT | Mod: PBBFAC,,, | Performed by: SURGERY

## 2025-08-21 PROCEDURE — 99212 OFFICE O/P EST SF 10 MIN: CPT | Mod: S$PBB,,, | Performed by: SURGERY

## 2025-08-21 RX ORDER — HYDROCODONE BITARTRATE AND ACETAMINOPHEN 7.5; 325 MG/1; MG/1
1 TABLET ORAL EVERY 6 HOURS PRN
Qty: 15 TABLET | Refills: 0 | Status: SHIPPED | OUTPATIENT
Start: 2025-08-21

## 2025-08-28 ENCOUNTER — OFFICE VISIT (OUTPATIENT)
Dept: VASCULAR SURGERY | Facility: CLINIC | Age: 70
End: 2025-08-28
Payer: OTHER GOVERNMENT

## 2025-08-28 VITALS
HEART RATE: 93 BPM | WEIGHT: 236 LBS | BODY MASS INDEX: 33.86 KG/M2 | SYSTOLIC BLOOD PRESSURE: 115 MMHG | OXYGEN SATURATION: 96 % | DIASTOLIC BLOOD PRESSURE: 58 MMHG

## 2025-08-28 DIAGNOSIS — I73.9 PVD (PERIPHERAL VASCULAR DISEASE): Primary | ICD-10-CM

## 2025-08-28 PROCEDURE — 99214 OFFICE O/P EST MOD 30 MIN: CPT | Mod: S$PBB,,, | Performed by: SURGERY

## 2025-08-28 PROCEDURE — 99999 PR PBB SHADOW E&M-EST. PATIENT-LVL V: CPT | Mod: PBBFAC,,, | Performed by: SURGERY

## 2025-08-28 PROCEDURE — 99215 OFFICE O/P EST HI 40 MIN: CPT | Mod: PBBFAC | Performed by: SURGERY

## 2025-08-28 RX ORDER — ALBUTEROL SULFATE 0.63 MG/3ML
0.63 SOLUTION RESPIRATORY (INHALATION) EVERY 6 HOURS PRN
COMMUNITY
Start: 2023-12-13

## 2025-08-28 RX ORDER — ATORVASTATIN CALCIUM 40 MG/1
40 TABLET, FILM COATED ORAL NIGHTLY
COMMUNITY

## 2025-08-28 RX ORDER — SODIUM CHLORIDE 9 MG/ML
INJECTION, SOLUTION INTRAVENOUS CONTINUOUS
OUTPATIENT
Start: 2025-08-28

## 2025-08-28 RX ORDER — SILDENAFIL 50 MG/1
50 TABLET, FILM COATED ORAL DAILY PRN
COMMUNITY

## 2025-08-28 RX ORDER — FERROUS SULFATE 324(65)MG
324 TABLET, DELAYED RELEASE (ENTERIC COATED) ORAL 2 TIMES DAILY
COMMUNITY

## 2025-08-28 RX ORDER — LATANOPROST 50 UG/ML
1 SOLUTION/ DROPS OPHTHALMIC NIGHTLY
COMMUNITY
Start: 2025-06-25

## 2025-08-28 RX ORDER — DILTIAZEM HYDROCHLORIDE 120 MG/1
120 CAPSULE, EXTENDED RELEASE ORAL
COMMUNITY

## 2025-08-28 RX ORDER — CLINDAMYCIN PHOSPHATE 900 MG/50ML
900 INJECTION, SOLUTION INTRAVENOUS
OUTPATIENT
Start: 2025-08-28

## 2025-08-28 RX ORDER — LOSARTAN POTASSIUM 100 MG/1
100 TABLET ORAL DAILY
COMMUNITY
Start: 2023-12-13

## 2025-08-28 RX ORDER — ONDANSETRON HYDROCHLORIDE 2 MG/ML
4 INJECTION, SOLUTION INTRAVENOUS EVERY 12 HOURS PRN
OUTPATIENT
Start: 2025-08-28

## 2025-08-28 RX ORDER — MAGNESIUM OXIDE 420 MG/1
420 TABLET ORAL DAILY
COMMUNITY
Start: 2023-08-30

## (undated) DEVICE — GLOVE PROTEXIS PI SYN SURG 7.5

## (undated) DEVICE — GUIDEWIRE AQUATRACK REG 260CM

## (undated) DEVICE — Device

## (undated) DEVICE — CATHETER RENAL BIFURCATION 5FR X 65CM RBI

## (undated) DEVICE — GLOVE PROTEXIS PI SYN SURG 6.0

## (undated) DEVICE — COVER PROBE WITH BAND 6X96IN

## (undated) DEVICE — SHEATH BRITE TIP INTRO 11CM 5F

## (undated) DEVICE — GLOVE 6.0 PROTEXIS PI BLUE

## (undated) DEVICE — GLOW N TELL

## (undated) DEVICE — GLOVE PROTEXIS PI SYN SURG 6.5

## (undated) DEVICE — GLOVE 6.5 PROTEXIS PI BLUE

## (undated) DEVICE — COVER SNAP KAP 26IN

## (undated) DEVICE — SOL NACL IRR 1000ML BTL

## (undated) DEVICE — GOWN NONREINF SET-IN SLV 2XL

## (undated) DEVICE — BANDAGE GAUZE COT STRL 4.5X4.1